# Patient Record
Sex: FEMALE | Race: WHITE | Employment: OTHER | URBAN - METROPOLITAN AREA
[De-identification: names, ages, dates, MRNs, and addresses within clinical notes are randomized per-mention and may not be internally consistent; named-entity substitution may affect disease eponyms.]

---

## 2021-09-26 ENCOUNTER — ANESTHESIA EVENT (OUTPATIENT)
Dept: SURGERY | Age: 81
DRG: 481 | End: 2021-09-26
Payer: MEDICARE

## 2021-09-26 ENCOUNTER — APPOINTMENT (OUTPATIENT)
Dept: CT IMAGING | Age: 81
DRG: 481 | End: 2021-09-26
Attending: EMERGENCY MEDICINE
Payer: MEDICARE

## 2021-09-26 ENCOUNTER — APPOINTMENT (OUTPATIENT)
Dept: GENERAL RADIOLOGY | Age: 81
DRG: 481 | End: 2021-09-26
Attending: EMERGENCY MEDICINE
Payer: MEDICARE

## 2021-09-26 ENCOUNTER — APPOINTMENT (OUTPATIENT)
Dept: GENERAL RADIOLOGY | Age: 81
DRG: 481 | End: 2021-09-26
Attending: ORTHOPAEDIC SURGERY
Payer: MEDICARE

## 2021-09-26 ENCOUNTER — HOSPITAL ENCOUNTER (INPATIENT)
Age: 81
LOS: 3 days | Discharge: NURSING, REHAB OR LONG-TERM CARE | DRG: 481 | End: 2021-09-29
Attending: EMERGENCY MEDICINE | Admitting: HOSPITALIST
Payer: MEDICARE

## 2021-09-26 ENCOUNTER — ANESTHESIA (OUTPATIENT)
Dept: SURGERY | Age: 81
DRG: 481 | End: 2021-09-26
Payer: MEDICARE

## 2021-09-26 DIAGNOSIS — S72.001A CLOSED FRACTURE OF RIGHT HIP, INITIAL ENCOUNTER (HCC): ICD-10-CM

## 2021-09-26 DIAGNOSIS — F03.90 DEMENTIA WITHOUT BEHAVIORAL DISTURBANCE, UNSPECIFIED DEMENTIA TYPE: Primary | ICD-10-CM

## 2021-09-26 DIAGNOSIS — I48.0 PAF (PAROXYSMAL ATRIAL FIBRILLATION) (HCC): ICD-10-CM

## 2021-09-26 PROBLEM — S72.009A HIP FRACTURE (HCC): Status: ACTIVE | Noted: 2021-09-26

## 2021-09-26 LAB
ALBUMIN SERPL-MCNC: 3.3 G/DL (ref 3.5–5)
ALBUMIN/GLOB SERPL: 1 {RATIO} (ref 1.1–2.2)
ALP SERPL-CCNC: 76 U/L (ref 45–117)
ALT SERPL-CCNC: 16 U/L (ref 12–78)
ANION GAP SERPL CALC-SCNC: 4 MMOL/L (ref 5–15)
APPEARANCE UR: CLEAR
APTT PPP: 33.3 SEC (ref 22.1–31)
AST SERPL-CCNC: 34 U/L (ref 15–37)
ATRIAL RATE: 68 BPM
BASOPHILS # BLD: 0 K/UL (ref 0–0.1)
BASOPHILS NFR BLD: 0 % (ref 0–1)
BILIRUB SERPL-MCNC: 0.5 MG/DL (ref 0.2–1)
BILIRUB UR QL: NEGATIVE
BUN SERPL-MCNC: 23 MG/DL (ref 6–20)
BUN/CREAT SERPL: 24 (ref 12–20)
CALCIUM SERPL-MCNC: 8.8 MG/DL (ref 8.5–10.1)
CALCULATED P AXIS, ECG09: 78 DEGREES
CALCULATED R AXIS, ECG10: 56 DEGREES
CALCULATED T AXIS, ECG11: 51 DEGREES
CHLORIDE SERPL-SCNC: 106 MMOL/L (ref 97–108)
CO2 SERPL-SCNC: 24 MMOL/L (ref 21–32)
COLOR UR: NORMAL
COMMENT, HOLDF: NORMAL
COMMENT, HOLDF: NORMAL
COVID-19 RAPID TEST, COVR: NOT DETECTED
CREAT SERPL-MCNC: 0.96 MG/DL (ref 0.55–1.02)
DIAGNOSIS, 93000: NORMAL
DIFFERENTIAL METHOD BLD: ABNORMAL
EOSINOPHIL # BLD: 0 K/UL (ref 0–0.4)
EOSINOPHIL NFR BLD: 0 % (ref 0–7)
ERYTHROCYTE [DISTWIDTH] IN BLOOD BY AUTOMATED COUNT: 15.6 % (ref 11.5–14.5)
GLOBULIN SER CALC-MCNC: 3.3 G/DL (ref 2–4)
GLUCOSE BLD STRIP.AUTO-MCNC: 177 MG/DL (ref 65–117)
GLUCOSE SERPL-MCNC: 150 MG/DL (ref 65–100)
GLUCOSE UR STRIP.AUTO-MCNC: NEGATIVE MG/DL
HCT VFR BLD AUTO: 34.1 % (ref 35–47)
HGB BLD-MCNC: 11.1 G/DL (ref 11.5–16)
HGB UR QL STRIP: NEGATIVE
IMM GRANULOCYTES # BLD AUTO: 0.2 K/UL (ref 0–0.04)
IMM GRANULOCYTES NFR BLD AUTO: 1 % (ref 0–0.5)
INR PPP: 1.4 (ref 0.9–1.1)
KETONES UR QL STRIP.AUTO: NEGATIVE MG/DL
LEUKOCYTE ESTERASE UR QL STRIP.AUTO: NEGATIVE
LYMPHOCYTES # BLD: 0.6 K/UL (ref 0.8–3.5)
LYMPHOCYTES NFR BLD: 3 % (ref 12–49)
MCH RBC QN AUTO: 29.1 PG (ref 26–34)
MCHC RBC AUTO-ENTMCNC: 32.6 G/DL (ref 30–36.5)
MCV RBC AUTO: 89.5 FL (ref 80–99)
MONOCYTES # BLD: 1.3 K/UL (ref 0–1)
MONOCYTES NFR BLD: 7 % (ref 5–13)
NEUTS SEG # BLD: 16.3 K/UL (ref 1.8–8)
NEUTS SEG NFR BLD: 89 % (ref 32–75)
NITRITE UR QL STRIP.AUTO: NEGATIVE
NRBC # BLD: 0 K/UL (ref 0–0.01)
NRBC BLD-RTO: 0 PER 100 WBC
P-R INTERVAL, ECG05: 234 MS
PH UR STRIP: 7 [PH] (ref 5–8)
PLATELET # BLD AUTO: 285 K/UL (ref 150–400)
PMV BLD AUTO: 12.2 FL (ref 8.9–12.9)
POTASSIUM SERPL-SCNC: 4.8 MMOL/L (ref 3.5–5.1)
PROT SERPL-MCNC: 6.6 G/DL (ref 6.4–8.2)
PROT UR STRIP-MCNC: NEGATIVE MG/DL
PROTHROMBIN TIME: 14.7 SEC (ref 9–11.1)
Q-T INTERVAL, ECG07: 440 MS
QRS DURATION, ECG06: 92 MS
QTC CALCULATION (BEZET), ECG08: 467 MS
RBC # BLD AUTO: 3.81 M/UL (ref 3.8–5.2)
RBC MORPH BLD: ABNORMAL
SAMPLES BEING HELD,HOLD: NORMAL
SAMPLES BEING HELD,HOLD: NORMAL
SERVICE CMNT-IMP: ABNORMAL
SODIUM SERPL-SCNC: 134 MMOL/L (ref 136–145)
SOURCE, COVRS: NORMAL
SP GR UR REFRACTOMETRY: 1.01 (ref 1–1.03)
THERAPEUTIC RANGE,PTTT: ABNORMAL SECS (ref 58–77)
UROBILINOGEN UR QL STRIP.AUTO: 0.2 EU/DL (ref 0.2–1)
VENTRICULAR RATE, ECG03: 68 BPM
WBC # BLD AUTO: 18.4 K/UL (ref 3.6–11)

## 2021-09-26 PROCEDURE — 87635 SARS-COV-2 COVID-19 AMP PRB: CPT

## 2021-09-26 PROCEDURE — C1713 ANCHOR/SCREW BN/BN,TIS/BN: HCPCS | Performed by: ORTHOPAEDIC SURGERY

## 2021-09-26 PROCEDURE — 0QS636Z REPOSITION RIGHT UPPER FEMUR WITH INTRAMEDULLARY INTERNAL FIXATION DEVICE, PERCUTANEOUS APPROACH: ICD-10-PCS | Performed by: ORTHOPAEDIC SURGERY

## 2021-09-26 PROCEDURE — 74011250637 HC RX REV CODE- 250/637: Performed by: HOSPITALIST

## 2021-09-26 PROCEDURE — 70450 CT HEAD/BRAIN W/O DYE: CPT

## 2021-09-26 PROCEDURE — 80053 COMPREHEN METABOLIC PANEL: CPT

## 2021-09-26 PROCEDURE — 85610 PROTHROMBIN TIME: CPT

## 2021-09-26 PROCEDURE — 77030008846 HC WRE K STRY -C: Performed by: ORTHOPAEDIC SURGERY

## 2021-09-26 PROCEDURE — 99285 EMERGENCY DEPT VISIT HI MDM: CPT

## 2021-09-26 PROCEDURE — 86901 BLOOD TYPING SEROLOGIC RH(D): CPT

## 2021-09-26 PROCEDURE — 74011250636 HC RX REV CODE- 250/636

## 2021-09-26 PROCEDURE — 73501 X-RAY EXAM HIP UNI 1 VIEW: CPT

## 2021-09-26 PROCEDURE — 71045 X-RAY EXAM CHEST 1 VIEW: CPT

## 2021-09-26 PROCEDURE — 74011250636 HC RX REV CODE- 250/636: Performed by: PHYSICIAN ASSISTANT

## 2021-09-26 PROCEDURE — 76210000016 HC OR PH I REC 1 TO 1.5 HR: Performed by: ORTHOPAEDIC SURGERY

## 2021-09-26 PROCEDURE — 93005 ELECTROCARDIOGRAM TRACING: CPT

## 2021-09-26 PROCEDURE — 73502 X-RAY EXAM HIP UNI 2-3 VIEWS: CPT

## 2021-09-26 PROCEDURE — 74011250636 HC RX REV CODE- 250/636: Performed by: NURSE ANESTHETIST, CERTIFIED REGISTERED

## 2021-09-26 PROCEDURE — P9045 ALBUMIN (HUMAN), 5%, 250 ML: HCPCS | Performed by: NURSE ANESTHETIST, CERTIFIED REGISTERED

## 2021-09-26 PROCEDURE — 77030008684 HC TU ET CUF COVD -B: Performed by: STUDENT IN AN ORGANIZED HEALTH CARE EDUCATION/TRAINING PROGRAM

## 2021-09-26 PROCEDURE — 77030020788: Performed by: ORTHOPAEDIC SURGERY

## 2021-09-26 PROCEDURE — 82962 GLUCOSE BLOOD TEST: CPT

## 2021-09-26 PROCEDURE — 74011250636 HC RX REV CODE- 250/636: Performed by: STUDENT IN AN ORGANIZED HEALTH CARE EDUCATION/TRAINING PROGRAM

## 2021-09-26 PROCEDURE — 81003 URINALYSIS AUTO W/O SCOPE: CPT

## 2021-09-26 PROCEDURE — 85025 COMPLETE CBC W/AUTO DIFF WBC: CPT

## 2021-09-26 PROCEDURE — 85730 THROMBOPLASTIN TIME PARTIAL: CPT

## 2021-09-26 PROCEDURE — 76010000149 HC OR TIME 1 TO 1.5 HR: Performed by: ORTHOPAEDIC SURGERY

## 2021-09-26 PROCEDURE — 99222 1ST HOSP IP/OBS MODERATE 55: CPT | Performed by: SPECIALIST

## 2021-09-26 PROCEDURE — C1769 GUIDE WIRE: HCPCS | Performed by: ORTHOPAEDIC SURGERY

## 2021-09-26 PROCEDURE — 86923 COMPATIBILITY TEST ELECTRIC: CPT

## 2021-09-26 PROCEDURE — 77030016451 HC BIT DRL AO3 STRY -C: Performed by: ORTHOPAEDIC SURGERY

## 2021-09-26 PROCEDURE — 65270000029 HC RM PRIVATE

## 2021-09-26 PROCEDURE — 77030031139 HC SUT VCRL2 J&J -A: Performed by: ORTHOPAEDIC SURGERY

## 2021-09-26 PROCEDURE — 36415 COLL VENOUS BLD VENIPUNCTURE: CPT

## 2021-09-26 PROCEDURE — 77030008468 HC STPLR SKN VISIS TELE -A: Performed by: ORTHOPAEDIC SURGERY

## 2021-09-26 PROCEDURE — 77030026438 HC STYL ET INTUB CARD -A: Performed by: STUDENT IN AN ORGANIZED HEALTH CARE EDUCATION/TRAINING PROGRAM

## 2021-09-26 PROCEDURE — 74011000250 HC RX REV CODE- 250: Performed by: PHYSICIAN ASSISTANT

## 2021-09-26 PROCEDURE — 76060000033 HC ANESTHESIA 1 TO 1.5 HR: Performed by: ORTHOPAEDIC SURGERY

## 2021-09-26 PROCEDURE — 74011000250 HC RX REV CODE- 250: Performed by: NURSE ANESTHETIST, CERTIFIED REGISTERED

## 2021-09-26 PROCEDURE — 77030013079 HC BLNKT BAIR HGGR 3M -A: Performed by: STUDENT IN AN ORGANIZED HEALTH CARE EDUCATION/TRAINING PROGRAM

## 2021-09-26 PROCEDURE — 2709999900 HC NON-CHARGEABLE SUPPLY: Performed by: ORTHOPAEDIC SURGERY

## 2021-09-26 DEVICE — LOCKING SCREW, FULLY THREADED: Type: IMPLANTABLE DEVICE | Site: HIP | Status: FUNCTIONAL

## 2021-09-26 DEVICE — LAG SCREW, TI
Type: IMPLANTABLE DEVICE | Site: HIP | Status: FUNCTIONAL
Brand: GAMMA

## 2021-09-26 DEVICE — TROCHANTERIC NAIL KIT, TI
Type: IMPLANTABLE DEVICE | Site: HIP | Status: FUNCTIONAL
Brand: GAMMA

## 2021-09-26 RX ORDER — OXYCODONE HYDROCHLORIDE 5 MG/1
5 TABLET ORAL
Status: DISCONTINUED | OUTPATIENT
Start: 2021-09-26 | End: 2021-09-29 | Stop reason: HOSPADM

## 2021-09-26 RX ORDER — SODIUM CHLORIDE 0.9 % (FLUSH) 0.9 %
5-40 SYRINGE (ML) INJECTION EVERY 8 HOURS
Status: DISCONTINUED | OUTPATIENT
Start: 2021-09-26 | End: 2021-09-29 | Stop reason: HOSPADM

## 2021-09-26 RX ORDER — SODIUM CHLORIDE 9 MG/ML
125 INJECTION, SOLUTION INTRAVENOUS CONTINUOUS
Status: CANCELLED | OUTPATIENT
Start: 2021-09-26 | End: 2021-09-27

## 2021-09-26 RX ORDER — SODIUM CHLORIDE 0.9 % (FLUSH) 0.9 %
5-40 SYRINGE (ML) INJECTION EVERY 8 HOURS
Status: DISCONTINUED | OUTPATIENT
Start: 2021-09-26 | End: 2021-09-26 | Stop reason: HOSPADM

## 2021-09-26 RX ORDER — OXYCODONE AND ACETAMINOPHEN 5; 325 MG/1; MG/1
1 TABLET ORAL
Status: DISCONTINUED | OUTPATIENT
Start: 2021-09-26 | End: 2021-09-27 | Stop reason: ALTCHOICE

## 2021-09-26 RX ORDER — ONDANSETRON 2 MG/ML
INJECTION INTRAMUSCULAR; INTRAVENOUS AS NEEDED
Status: DISCONTINUED | OUTPATIENT
Start: 2021-09-26 | End: 2021-09-26 | Stop reason: HOSPADM

## 2021-09-26 RX ORDER — SODIUM CHLORIDE 0.9 % (FLUSH) 0.9 %
5-40 SYRINGE (ML) INJECTION AS NEEDED
Status: DISCONTINUED | OUTPATIENT
Start: 2021-09-26 | End: 2021-09-26 | Stop reason: HOSPADM

## 2021-09-26 RX ORDER — LEVOTHYROXINE SODIUM 100 UG/1
100 TABLET ORAL
Status: DISCONTINUED | OUTPATIENT
Start: 2021-09-27 | End: 2021-09-29 | Stop reason: HOSPADM

## 2021-09-26 RX ORDER — MORPHINE SULFATE 10 MG/ML
INJECTION, SOLUTION INTRAMUSCULAR; INTRAVENOUS
Status: COMPLETED
Start: 2021-09-26 | End: 2021-09-26

## 2021-09-26 RX ORDER — OXYCODONE AND ACETAMINOPHEN 5; 325 MG/1; MG/1
1 TABLET ORAL AS NEEDED
Status: DISCONTINUED | OUTPATIENT
Start: 2021-09-26 | End: 2021-09-26 | Stop reason: HOSPADM

## 2021-09-26 RX ORDER — FENTANYL CITRATE 50 UG/ML
50 INJECTION, SOLUTION INTRAMUSCULAR; INTRAVENOUS AS NEEDED
Status: CANCELLED | OUTPATIENT
Start: 2021-09-26

## 2021-09-26 RX ORDER — MIDAZOLAM HYDROCHLORIDE 1 MG/ML
1 INJECTION, SOLUTION INTRAMUSCULAR; INTRAVENOUS AS NEEDED
Status: CANCELLED | OUTPATIENT
Start: 2021-09-26

## 2021-09-26 RX ORDER — FAMOTIDINE 20 MG/1
20 TABLET, FILM COATED ORAL EVERY 24 HOURS
Status: DISCONTINUED | OUTPATIENT
Start: 2021-09-26 | End: 2021-09-29 | Stop reason: HOSPADM

## 2021-09-26 RX ORDER — DIPHENHYDRAMINE HYDROCHLORIDE 50 MG/ML
12.5 INJECTION, SOLUTION INTRAMUSCULAR; INTRAVENOUS AS NEEDED
Status: DISCONTINUED | OUTPATIENT
Start: 2021-09-26 | End: 2021-09-26 | Stop reason: HOSPADM

## 2021-09-26 RX ORDER — SODIUM CHLORIDE 0.9 % (FLUSH) 0.9 %
5-40 SYRINGE (ML) INJECTION AS NEEDED
Status: DISCONTINUED | OUTPATIENT
Start: 2021-09-26 | End: 2021-09-29 | Stop reason: HOSPADM

## 2021-09-26 RX ORDER — SODIUM CHLORIDE, SODIUM LACTATE, POTASSIUM CHLORIDE, CALCIUM CHLORIDE 600; 310; 30; 20 MG/100ML; MG/100ML; MG/100ML; MG/100ML
1000 INJECTION, SOLUTION INTRAVENOUS CONTINUOUS
Status: DISCONTINUED | OUTPATIENT
Start: 2021-09-26 | End: 2021-09-26 | Stop reason: HOSPADM

## 2021-09-26 RX ORDER — SUCCINYLCHOLINE CHLORIDE 20 MG/ML
INJECTION INTRAMUSCULAR; INTRAVENOUS AS NEEDED
Status: DISCONTINUED | OUTPATIENT
Start: 2021-09-26 | End: 2021-09-26 | Stop reason: HOSPADM

## 2021-09-26 RX ORDER — FACIAL-BODY WIPES
10 EACH TOPICAL DAILY PRN
Status: DISCONTINUED | OUTPATIENT
Start: 2021-09-28 | End: 2021-09-29 | Stop reason: HOSPADM

## 2021-09-26 RX ORDER — MIRTAZAPINE 15 MG/1
15 TABLET, FILM COATED ORAL
Status: DISCONTINUED | OUTPATIENT
Start: 2021-09-26 | End: 2021-09-29 | Stop reason: HOSPADM

## 2021-09-26 RX ORDER — OXYCODONE HYDROCHLORIDE 5 MG/1
2.5 TABLET ORAL
Status: DISCONTINUED | OUTPATIENT
Start: 2021-09-26 | End: 2021-09-29 | Stop reason: HOSPADM

## 2021-09-26 RX ORDER — SODIUM CHLORIDE 9 MG/ML
1000 INJECTION, SOLUTION INTRAVENOUS CONTINUOUS
Status: DISCONTINUED | OUTPATIENT
Start: 2021-09-26 | End: 2021-09-26 | Stop reason: HOSPADM

## 2021-09-26 RX ORDER — ONDANSETRON 2 MG/ML
4 INJECTION INTRAMUSCULAR; INTRAVENOUS AS NEEDED
Status: DISCONTINUED | OUTPATIENT
Start: 2021-09-26 | End: 2021-09-26 | Stop reason: HOSPADM

## 2021-09-26 RX ORDER — FENTANYL CITRATE 50 UG/ML
25 INJECTION, SOLUTION INTRAMUSCULAR; INTRAVENOUS
Status: DISCONTINUED | OUTPATIENT
Start: 2021-09-26 | End: 2021-09-26 | Stop reason: HOSPADM

## 2021-09-26 RX ORDER — SODIUM CHLORIDE 0.9 % (FLUSH) 0.9 %
5-40 SYRINGE (ML) INJECTION AS NEEDED
Status: CANCELLED | OUTPATIENT
Start: 2021-09-26

## 2021-09-26 RX ORDER — SODIUM CHLORIDE 0.9 % (FLUSH) 0.9 %
5-40 SYRINGE (ML) INJECTION EVERY 8 HOURS
Status: CANCELLED | OUTPATIENT
Start: 2021-09-26

## 2021-09-26 RX ORDER — SODIUM CHLORIDE, SODIUM LACTATE, POTASSIUM CHLORIDE, CALCIUM CHLORIDE 600; 310; 30; 20 MG/100ML; MG/100ML; MG/100ML; MG/100ML
125 INJECTION, SOLUTION INTRAVENOUS CONTINUOUS
Status: CANCELLED | OUTPATIENT
Start: 2021-09-26 | End: 2021-09-27

## 2021-09-26 RX ORDER — EPHEDRINE SULFATE/0.9% NACL/PF 50 MG/5 ML
5 SYRINGE (ML) INTRAVENOUS AS NEEDED
Status: DISCONTINUED | OUTPATIENT
Start: 2021-09-26 | End: 2021-09-26 | Stop reason: HOSPADM

## 2021-09-26 RX ORDER — NALOXONE HYDROCHLORIDE 0.4 MG/ML
0.4 INJECTION, SOLUTION INTRAMUSCULAR; INTRAVENOUS; SUBCUTANEOUS AS NEEDED
Status: DISCONTINUED | OUTPATIENT
Start: 2021-09-26 | End: 2021-09-29 | Stop reason: HOSPADM

## 2021-09-26 RX ORDER — DEXAMETHASONE SODIUM PHOSPHATE 4 MG/ML
INJECTION, SOLUTION INTRA-ARTICULAR; INTRALESIONAL; INTRAMUSCULAR; INTRAVENOUS; SOFT TISSUE AS NEEDED
Status: DISCONTINUED | OUTPATIENT
Start: 2021-09-26 | End: 2021-09-26 | Stop reason: HOSPADM

## 2021-09-26 RX ORDER — MORPHINE SULFATE 2 MG/ML
2 INJECTION, SOLUTION INTRAMUSCULAR; INTRAVENOUS
Status: DISCONTINUED | OUTPATIENT
Start: 2021-09-26 | End: 2021-09-26 | Stop reason: HOSPADM

## 2021-09-26 RX ORDER — POLYETHYLENE GLYCOL 3350 17 G/17G
17 POWDER, FOR SOLUTION ORAL DAILY
Status: DISCONTINUED | OUTPATIENT
Start: 2021-09-27 | End: 2021-09-29 | Stop reason: HOSPADM

## 2021-09-26 RX ORDER — SODIUM CHLORIDE, SODIUM LACTATE, POTASSIUM CHLORIDE, CALCIUM CHLORIDE 600; 310; 30; 20 MG/100ML; MG/100ML; MG/100ML; MG/100ML
INJECTION, SOLUTION INTRAVENOUS
Status: DISCONTINUED | OUTPATIENT
Start: 2021-09-26 | End: 2021-09-26 | Stop reason: HOSPADM

## 2021-09-26 RX ORDER — ALBUMIN HUMAN 50 G/1000ML
SOLUTION INTRAVENOUS AS NEEDED
Status: DISCONTINUED | OUTPATIENT
Start: 2021-09-26 | End: 2021-09-26 | Stop reason: HOSPADM

## 2021-09-26 RX ORDER — MIDAZOLAM HYDROCHLORIDE 1 MG/ML
0.5 INJECTION, SOLUTION INTRAMUSCULAR; INTRAVENOUS
Status: DISCONTINUED | OUTPATIENT
Start: 2021-09-26 | End: 2021-09-26 | Stop reason: HOSPADM

## 2021-09-26 RX ORDER — HYDROMORPHONE HYDROCHLORIDE 1 MG/ML
0.2 INJECTION, SOLUTION INTRAMUSCULAR; INTRAVENOUS; SUBCUTANEOUS
Status: DISCONTINUED | OUTPATIENT
Start: 2021-09-26 | End: 2021-09-26 | Stop reason: HOSPADM

## 2021-09-26 RX ORDER — LIDOCAINE HYDROCHLORIDE 10 MG/ML
0.1 INJECTION, SOLUTION EPIDURAL; INFILTRATION; INTRACAUDAL; PERINEURAL AS NEEDED
Status: CANCELLED | OUTPATIENT
Start: 2021-09-26

## 2021-09-26 RX ORDER — FERROUS SULFATE, DRIED 160(50) MG
1 TABLET, EXTENDED RELEASE ORAL
Status: DISCONTINUED | OUTPATIENT
Start: 2021-09-27 | End: 2021-09-29 | Stop reason: HOSPADM

## 2021-09-26 RX ORDER — LIDOCAINE HYDROCHLORIDE 20 MG/ML
INJECTION, SOLUTION EPIDURAL; INFILTRATION; INTRACAUDAL; PERINEURAL AS NEEDED
Status: DISCONTINUED | OUTPATIENT
Start: 2021-09-26 | End: 2021-09-26 | Stop reason: HOSPADM

## 2021-09-26 RX ORDER — FENTANYL CITRATE 50 UG/ML
INJECTION, SOLUTION INTRAMUSCULAR; INTRAVENOUS AS NEEDED
Status: DISCONTINUED | OUTPATIENT
Start: 2021-09-26 | End: 2021-09-26 | Stop reason: HOSPADM

## 2021-09-26 RX ORDER — QUETIAPINE FUMARATE 25 MG/1
50 TABLET, FILM COATED ORAL 2 TIMES DAILY
Status: DISCONTINUED | OUTPATIENT
Start: 2021-09-26 | End: 2021-09-29 | Stop reason: HOSPADM

## 2021-09-26 RX ORDER — AMOXICILLIN 250 MG
1 CAPSULE ORAL 2 TIMES DAILY
Status: DISCONTINUED | OUTPATIENT
Start: 2021-09-26 | End: 2021-09-29 | Stop reason: HOSPADM

## 2021-09-26 RX ORDER — PROPOFOL 10 MG/ML
INJECTION, EMULSION INTRAVENOUS AS NEEDED
Status: DISCONTINUED | OUTPATIENT
Start: 2021-09-26 | End: 2021-09-26 | Stop reason: HOSPADM

## 2021-09-26 RX ORDER — SODIUM CHLORIDE 9 MG/ML
125 INJECTION, SOLUTION INTRAVENOUS CONTINUOUS
Status: DISPENSED | OUTPATIENT
Start: 2021-09-26 | End: 2021-09-27

## 2021-09-26 RX ORDER — POTASSIUM CHLORIDE 750 MG/1
20 TABLET, FILM COATED, EXTENDED RELEASE ORAL DAILY
Status: DISCONTINUED | OUTPATIENT
Start: 2021-09-27 | End: 2021-09-29 | Stop reason: HOSPADM

## 2021-09-26 RX ORDER — ROCURONIUM BROMIDE 10 MG/ML
INJECTION, SOLUTION INTRAVENOUS AS NEEDED
Status: DISCONTINUED | OUTPATIENT
Start: 2021-09-26 | End: 2021-09-26 | Stop reason: HOSPADM

## 2021-09-26 RX ORDER — KETAMINE HYDROCHLORIDE 10 MG/ML
INJECTION, SOLUTION INTRAMUSCULAR; INTRAVENOUS AS NEEDED
Status: DISCONTINUED | OUTPATIENT
Start: 2021-09-26 | End: 2021-09-26 | Stop reason: HOSPADM

## 2021-09-26 RX ORDER — SERTRALINE HYDROCHLORIDE 50 MG/1
150 TABLET, FILM COATED ORAL DAILY
Status: DISCONTINUED | OUTPATIENT
Start: 2021-09-27 | End: 2021-09-29 | Stop reason: HOSPADM

## 2021-09-26 RX ORDER — ACETAMINOPHEN 500 MG
1000 TABLET ORAL ONCE
Status: CANCELLED | OUTPATIENT
Start: 2021-09-26 | End: 2021-09-26

## 2021-09-26 RX ADMIN — WATER 2 G: 1 INJECTION INTRAMUSCULAR; INTRAVENOUS; SUBCUTANEOUS at 17:49

## 2021-09-26 RX ADMIN — ROCURONIUM BROMIDE 5 MG: 10 SOLUTION INTRAVENOUS at 17:32

## 2021-09-26 RX ADMIN — SODIUM CHLORIDE, POTASSIUM CHLORIDE, SODIUM LACTATE AND CALCIUM CHLORIDE: 600; 310; 30; 20 INJECTION, SOLUTION INTRAVENOUS at 17:25

## 2021-09-26 RX ADMIN — SODIUM CHLORIDE 125 ML/HR: 9 INJECTION, SOLUTION INTRAVENOUS at 18:40

## 2021-09-26 RX ADMIN — ONDANSETRON HYDROCHLORIDE 4 MG: 2 INJECTION, SOLUTION INTRAMUSCULAR; INTRAVENOUS at 18:10

## 2021-09-26 RX ADMIN — LIDOCAINE HYDROCHLORIDE 80 MG: 20 INJECTION, SOLUTION EPIDURAL; INFILTRATION; INTRACAUDAL; PERINEURAL at 17:32

## 2021-09-26 RX ADMIN — KETAMINE HYDROCHLORIDE 20 MG: 10 INJECTION, SOLUTION INTRAMUSCULAR; INTRAVENOUS at 17:32

## 2021-09-26 RX ADMIN — MORPHINE SULFATE 2 MG: 10 INJECTION INTRAVENOUS at 19:00

## 2021-09-26 RX ADMIN — ROCURONIUM BROMIDE 35 MG: 10 SOLUTION INTRAVENOUS at 17:39

## 2021-09-26 RX ADMIN — Medication 10 ML: at 22:00

## 2021-09-26 RX ADMIN — PROPOFOL 100 MG: 10 INJECTION, EMULSION INTRAVENOUS at 17:32

## 2021-09-26 RX ADMIN — MIRTAZAPINE 15 MG: 15 TABLET, FILM COATED ORAL at 23:59

## 2021-09-26 RX ADMIN — WATER 2 G: 1 INJECTION INTRAMUSCULAR; INTRAVENOUS; SUBCUTANEOUS at 23:16

## 2021-09-26 RX ADMIN — SUCCINYLCHOLINE CHLORIDE 100 MG: 20 INJECTION, SOLUTION INTRAMUSCULAR; INTRAVENOUS at 17:32

## 2021-09-26 RX ADMIN — FENTANYL CITRATE 25 MCG: 50 INJECTION, SOLUTION INTRAMUSCULAR; INTRAVENOUS at 17:32

## 2021-09-26 RX ADMIN — FENTANYL CITRATE 25 MCG: 50 INJECTION, SOLUTION INTRAMUSCULAR; INTRAVENOUS at 17:59

## 2021-09-26 RX ADMIN — ALBUMIN (HUMAN) 250 ML: 12.5 INJECTION, SOLUTION INTRAVENOUS at 17:44

## 2021-09-26 RX ADMIN — DEXAMETHASONE SODIUM PHOSPHATE 4 MG: 4 INJECTION, SOLUTION INTRAMUSCULAR; INTRAVENOUS at 17:46

## 2021-09-26 NOTE — PROGRESS NOTES
Bedside and Verbal shift change report given to Rianna Babin RN (oncoming nurse) by Dorina Cheatham RN (offgoing nurse). Report included the following information SBAR, ED Summary, OR Summary, Procedure Summary, MAR and Recent Results.

## 2021-09-26 NOTE — PROGRESS NOTES
Renal Dosing/Monitoring  Medication: Famotidine   Current regimen:  20mg PO every 12 hr  Recent Labs     09/26/21  1115   CREA 0.96   BUN 23*     Estimated CrCl:  39.7 ml/min  Plan: Change to Famotidine 20mg PO q24h  with respect to indication and renal status (CrCl <50 mL/min) per [facility]/[employer]-approved Renal Dosing Adjustment protocol. Pharmacy will continue to monitor this patient daily for changes in clinical status and renal function.

## 2021-09-26 NOTE — ANESTHESIA POSTPROCEDURE EVALUATION
Procedure(s):  Alli gamma nail. Soledad tableC arm.    general    Anesthesia Post Evaluation      Multimodal analgesia: multimodal analgesia used between 6 hours prior to anesthesia start to PACU discharge  Patient location during evaluation: bedside  Patient participation: complete - patient participated  Level of consciousness: awake  Pain score: 0  Pain management: satisfactory to patient  Airway patency: patent  Anesthetic complications: no  Cardiovascular status: acceptable and blood pressure returned to baseline  Respiratory status: acceptable  Hydration status: acceptable  Comments: I have evaluated the patient and meets criteria for discharge from PACU. Danni Jessica DO. Post anesthesia nausea and vomiting:  none  Final Post Anesthesia Temperature Assessment:  Normothermia (36.0-37.5 degrees C)      INITIAL Post-op Vital signs:   Vitals Value Taken Time   /83 09/26/21 1835   Temp 36.8 °C (98.3 °F) 09/26/21 1834   Pulse 78 09/26/21 1837   Resp 21 09/26/21 1837   SpO2 98 % 09/26/21 1837   Vitals shown include unvalidated device data.

## 2021-09-26 NOTE — ANESTHESIA PREPROCEDURE EVALUATION
Relevant Problems   No relevant active problems       Anesthetic History     Increased risk of difficult airway     Comments: H/o of tracheostomy and difficult intubation due to narrowed glottis     Review of Systems / Medical History  Patient summary reviewed, nursing notes reviewed and pertinent labs reviewed    Pulmonary                   Neuro/Psych         Psychiatric history     Cardiovascular    Hypertension        Dysrhythmias : atrial fibrillation  CAD      Comments: EKG 9/2021 shows SR with 1degree AVB    Paroxysmal atrial fibrillation, last took xarelto 9/25/2021   GI/Hepatic/Renal         Renal disease: CRI       Endo/Other        Anemia     Other Findings            Physical Exam    Airway  Mallampati: II  TM Distance: > 6 cm  Neck ROM: normal range of motion   Mouth opening: Normal     Cardiovascular    Rhythm: regular  Rate: normal         Dental  No notable dental hx       Pulmonary  Breath sounds clear to auscultation               Abdominal  GI exam deferred       Other Findings            Anesthetic Plan    ASA: 2, emergent  Anesthesia type: general          Induction: Intravenous  Anesthetic plan and risks discussed with: Patient and Son / Daughter      Patient had ability but questionable capacity due to confusion. Daughter was at bedside and the [employer], and we discussed the risks/benefits of anesthesia and agreed to proceed. The patient had a DNR and the daughter agreed to suspend the DNR for the perioperative period. Daughter mentioned the patient previously had a tracheostomy due to complicated pneumonia and as a result had a narrowed trachea/glottis and had previous difficulty with intubation.

## 2021-09-26 NOTE — H&P
[address] [facility]  HISTORY AND PHYSICAL    Name:  Hugo Martinez  MR#:  [id number]  :  1940  ACCOUNT #:  [de-identified]  ADMIT DATE:  2021      PRIMARY CARE PHYSICIAN:  Unknown. PRESENTING COMPLAINT:  Fall. HISTORY OF PRESENT ILLNESS:      The patient is a very pleasant 70-year-old  female who has previous history significant for chronic atrial fibrillation along with dementia and hypothyroidism. She currently resides at [facility]. The patient was sent to the emergency room as she had a fall early this morning. The patient lives with her sister in a single room in [facility]. Her sister fell down this morning. The patient  decided to help her, but  she also fell down. In the emergency room, the patient's imaging was suggestive of a fracture of the right femoral intertrochanteric area. We are asked to admit the patient. The patient's daughter is here who tells me that she had a right hip fracture last year. At that time, she was treated at [facility] as she has a narrow trachea and anesthesiologist told them to go to [facility]. At this time, the patient has no other complaints. PAST MEDICAL HISTORY:      1. Chronic atrial fibrillation. 2.  History of remote MI. The patient's daughter is not sure if she had a stent  3. Hypothyroidism. 4.  Dementia. 5.  Falls. 6.  Previous history of hip surgery. SOCIOECONOMIC HISTORY:      The patient does not smoke or drink. She lives at [facility]. Code status is do not resuscitate. FAMILY HISTORY:  Unknown. CURRENT MEDICATIONS PRIOR TO ADMISSION:      1. Levothyroxine 100 mcg daily. 2.  Potassium 20 mEq daily. 3.  Sertraline (Zoloft) 150 mg daily. 4.  Vitamin B12 every day sublingually. 5.  Xarelto 15 mg daily for AFib. 6.  Mirtazapine 15 mg daily for insomnia. 7.  BuSpar 15 mg twice daily for anxiety. 8.  P.r.n. Zofran.   9.  Metoprolol succinate 50 mg every 24 hours. 10.  Diclofenac gel topical application. 11.  Calcium. 12.  Quetiapine 50 mg 1 tablet p.o. b.i.d. REVIEW OF SYSTEMS:  Cannot be done as the patient has dementia. PHYSICAL EXAMINATION:    GENERAL:  The patient is an 40-year-old female who is not in any acute distress, resting comfortably. VITAL SIGNS:  Reveal temperature of 98.9, blood pressure 134/78, pulse 71, respiratory rate 19, saturation is 94% on room air. HEENT:  Examination reveals pupils equally reacting to light and accommodation. NECK:  Supple. There is no lymphadenopathy or JVD. CHEST:  Clear. No significant wheezing or crackles. CARDIOVASCULAR SYSTEM:  S1 and S2 regular. No murmur. No S3.  ABDOMEN:  Examination reveals no tenderness, no guarding or rigidity. Bowel sounds are active. EXTREMITIES:  No pedal edema. Hip examination of the right side shows decrease in motion with tenderness. CNS:  Examination reveals the patient is pleasantly confused. Otherwise nonfocal exam.    LABORATORY DATA:  Labs revealed white count is 18.4, hemoglobin is 11.1, hematocrit 34.1, MCV is 89.5, platelet count is 172,475. Sodium 134, potassium 4.8, chloride is 106, bicarbonate is 24, glucose 150, BUN is 33, creatinine 0.96, bilirubin 0.5, protein 6.6, albumin is 3.3. AST and ALT are normal.  Alkaline phosphatase is 76. INR is 1.4. EKG shows normal sinus rhythm with first-degree block, low voltage, no acute ischemic changes. CT scan of the head shows no acute intracranial process. It does show mild chronic microvascular ischemic changes with moderate to severe degree of cerebral atrophy. Chest x-ray shows no acute finding. X-ray of the hip shows comminuted right femoral intertrochanteric fracture. ASSESSMENT AND PLAN:      The patient is an 40-year-old female with previous history significant for chronic atrial fibrillation, remote MI ? Stents which was diagnosed last year, history of left hip fracture in 2020.   She is COVID vaccinated, is being admitted with:    1. Fall with right comminuted femoral intertrochanteric fracture. Orthopedics will be consulted. The patient is on Xarelto, not clear if she took it today. 2.  History of remote MI and chronic atrial fibrillation, currently in sinus rhythm. Cardiology clearence for surgery. No history of recent chest pain. Restart blood thinners after surgery per Ortho recommendation. 3.  History of hypothyroidism. Continue on levothyroxine. 4.  History of dementia with behavioral issues. We will continue current medications. 5.  History of atrial fibrillation, rate controlled, on metoprolol succinate with previous history of coronary artery disease. We will continue metoprolol. Consider adding statin. 6.  Deep venous thrombosis prophylaxis.       Kirsten Yates MD      [id number]  D:  09/26/2021 13:07  T:  09/26/2021 14:19  JOB #:  [id number]

## 2021-09-26 NOTE — PERIOP NOTES
TRANSFER - IN REPORT:    Verbal report received from CHIDI Del CastilloRN(name) on Cathryn Garza  being received from ED(unit) for ordered procedure      Report consisted of patients Situation, Background, Assessment and   Recommendations(SBAR). Information from the following report(s) ED Summary, Recent Results and Procedure Verification was reviewed with the receiving nurse. Opportunity for questions and clarification was provided. Assessment completed upon patients arrival to unit and care assumed.

## 2021-09-26 NOTE — ED PROVIDER NOTES
This is an 72-year-old female with dementia who apparently fell last night and since then has complained of severe pain in her right hip. She has the dementia but seems to think she may have hit her head. She denies any neck pain or pain elsewhere. There was no known loss of consciousness. She has not been able to get up and ambulate since the fall with the degree of pain in her hip. She denies any other acute symptomatology presently although she does have dementia. No past medical history on file. No past surgical history on file. No family history on file. Social History     Socioeconomic History    Marital status: Not on file     Spouse name: Not on file    Number of children: Not on file    Years of education: Not on file    Highest education level: Not on file   Occupational History    Not on file   Tobacco Use    Smoking status: Not on file   Substance and Sexual Activity    Alcohol use: Not on file    Drug use: Not on file    Sexual activity: Not on file   Other Topics Concern    Not on file   Social History Narrative    Not on file     Social Determinants of Health     Financial Resource Strain:     Difficulty of Paying Living Expenses:    Food Insecurity:     Worried About Running Out of Food in the Last Year:     [address] in the Last Year:    Transportation Needs:     Lack of Transportation (Medical):      Lack of Transportation (Non-Medical):    Physical Activity:     Days of Exercise per Week:     Minutes of Exercise per Session:    Stress:     Feeling of Stress :    Social Connections:     Frequency of Communication with Friends and Family:     Frequency of Social Gatherings with Friends and Family:     Attends [employer] Services:     Active Member of Clubs or Organizations:     Attends Club or Organization Meetings:     Marital Status:    Intimate Partner Violence:     Fear of Current or Ex-Partner:     Emotionally Abused:     Physically Abused:     Sexually Abused: ALLERGIES: Patient has no known allergies. Review of Systems   Unable to perform ROS: Dementia   Musculoskeletal:        Severe right hip pain. Inability to move the leg without pain. Vitals:    09/26/21 1056   BP: (!) 142/79   Pulse: 72   Resp: 22   Temp: 98.9 °F (37.2 °C)   SpO2: 94%   Weight: 57.5 kg (126 lb 12.2 oz)   Height: 5' 4\" (1.626 m)            Physical Exam  Vitals and nursing note reviewed. Constitutional:       General: She is in acute distress ( With pain in the right hip). Appearance: She is well-developed. She is not ill-appearing or diaphoretic. Comments: This is an 80-year-old female with dementia and some confusion. Vital signs are as noted. HENT:      Head: Normocephalic and atraumatic. Comments: There is no apparent scalp hematoma or evidence of trauma noted. Nose: Nose normal.      Mouth/Throat:      Mouth: Mucous membranes are moist.   Eyes:      General: No scleral icterus. Conjunctiva/sclera: Conjunctivae normal.      Pupils: Pupils are equal, round, and reactive to light. Neck:      Thyroid: No thyromegaly. Vascular: No JVD. Trachea: No tracheal deviation. Comments: No carotid bruits noted. Cardiovascular:      Rate and Rhythm: Normal rate and regular rhythm. Heart sounds: Normal heart sounds. No murmur heard. No friction rub. No gallop. Pulmonary:      Effort: Pulmonary effort is normal. No respiratory distress. Breath sounds: Normal breath sounds. No wheezing or rales. Chest:      Chest wall: No tenderness. Abdominal:      General: Bowel sounds are normal. There is no distension. Palpations: Abdomen is soft. There is no mass. Tenderness: There is no abdominal tenderness. There is no guarding or rebound.    Musculoskeletal:         General: Tenderness ( There is pain to palpation of the right hip with apparent foreshortening and external rotation noted in the same leg.) present. No deformity or signs of injury. Normal range of motion. Cervical back: Normal range of motion and neck supple. Comments: There is foreshortening and external rotation of the right lower extremity. There is pain in the right hip with movement of the same. Other extremities without apparent injury   Lymphadenopathy:      Cervical: No cervical adenopathy. Skin:     General: Skin is warm and dry. Capillary Refill: Capillary refill takes 2 to 3 seconds. Findings: No erythema or rash. Neurological:      General: No focal deficit present. Mental Status: She is alert. Mental status is at baseline. Cranial Nerves: No cranial nerve deficit. Sensory: No sensory deficit. Coordination: Coordination normal.      Deep Tendon Reflexes: Reflexes are normal and symmetric. Psychiatric:      Comments: Unable to determine with dementia          MDM  Number of Diagnoses or Management Options  Closed fracture of right hip, initial encounter [facility]): new and requires workup  Dementia without behavioral disturbance, unspecified dementia type ([facility]): established and worsening     Amount and/or Complexity of Data Reviewed  Clinical lab tests: reviewed and ordered  Tests in the radiology section of CPT®: ordered and reviewed  Decide to obtain previous medical records or to obtain history from someone other than the patient: yes  Review and summarize past medical records: yes  Discuss the patient with other providers: yes  Independent visualization of images, tracings, or specimens: yes    Risk of Complications, Morbidity, and/or Mortality  Presenting problems: high  Diagnostic procedures: high  Management options: high    Patient Progress  Patient progress: stable         Procedures    X-rays of the right hip and CT of the head are ordered as well as admission lab.   Patient likely has a fracture of the right hip.    11:44 AM   The patient's hip x-ray demonstrates a comminuted intertrochanteric fracture of the right hip. We will consult the family for orthopedic preference. Patient will require admission for further evaluation and treatment. Patient's prior hip replacement was done at [facility]. Daughter would rather the surgery be done here. Will consult orthopedics and medicine. Perfect Serve Consult for Admission  11:51 AM    ED Room Number: UQ96/16  Patient Name and age:  Valdo Kolb 80 y.o.  female  Working Diagnosis:   1. Dementia without behavioral disturbance, unspecified dementia type ([facility]    2. Closed fracture of right hip, initial encounter ([facility]        COVID-19 Suspicion:  no  Sepsis present:  no  Reassessment needed: no  Code Status:  Full Code  Readmission: no  Isolation Requirements:  no  Recommended Level of Care:  med/surg  Department:[facility] Adult ED - 21   Other:  Ortho consulted    ED MD EKG interpretation: There is a normal sinus rhythm with first-degree AV block. Axis is normal, intervals are normal.  Poor R wave progression is noted. No acute ischemic changes noted    12:04 PM I have spoken with Ortho and they will see the patient shortly in the ED.   She is being kept n.p.o.

## 2021-09-26 NOTE — CONSULTS
HISTORY OF PRESENT ILLNESS:       The patient is a very pleasant 80-year-old  female who has previous history significant for chronic atrial fibrillation along with dementia and hypothyroidism. She currently resides at [facility]. The patient was sent to the emergency room as she had a fall early this morning. The patient lives with her sister in a single room in [facility]. Her sister fell down this morning. The patient  decided to help her, but  she also fell down. In the emergency room, the patient's imaging was suggestive of a fracture of the right femoral intertrochanteric area. We are asked to admit the patient. The patient's daughter is here who tells me that she had a right hip fracture last year. At that time, she was treated at [facility] as she has a narrow trachea and anesthesiologist told them to go to [facility]. At this time, the patient has no other complaints. X Rays performed in the ER reveal right intertrochanteric hip fracture. Orthopedic surgery consulted to further evaluate and treat the above fracture. PAST MEDICAL HISTORY:       1. Chronic atrial fibrillation. 2.  History of remote MI. The patient's daughter is not sure if she had a stent  3. Hypothyroidism. 4.  Dementia. 5.  Falls. 6.  Previous history of hip surgery.     SOCIOECONOMIC HISTORY:      The patient does not smoke or drink. She lives at [facility]. Code status is do not resuscitate. CURRENT MEDICATIONS PRIOR TO ADMISSION:       1.  Levothyroxine 100 mcg daily. 2.  Potassium 20 mEq daily. 3.  Sertraline (Zoloft) 150 mg daily. 4.  Vitamin B12 every day sublingually. 5.  Xarelto 15 mg daily for AFib. 6.  Mirtazapine 15 mg daily for insomnia. 7.  BuSpar 15 mg twice daily for anxiety. 8.  P.r.n. Zofran. 9.  Metoprolol succinate 50 mg every 24 hours. 10.  Diclofenac gel topical application. 11.  Calcium.   12.  Quetiapine 50 mg 1 tablet p.o. b.i.d.     REVIEW OF SYSTEMS:  Cannot be done as the patient has dementia. Physical Exam:  GENERAL:  The patient is an 27-year-old female who is not in any acute distress, resting comfortably. VITAL SIGNS:  Reveal temperature of 98.9, blood pressure 134/78, pulse 71, respiratory rate 19, saturation is 94% on room air. HEENT:  Examination reveals pupils equally reacting to light and accommodation. NECK:  Supple. There is no lymphadenopathy or JVD. CHEST:  Clear. No significant wheezing or crackles. CARDIOVASCULAR SYSTEM:  S1 and S2 regular. No murmur. No S3.  ABDOMEN:  Examination reveals no tenderness, no guarding or rigidity. Bowel sounds are active. EXTREMITIES:  No pedal edema. Hip examination of the right side shows decrease in motion with tenderness. CNS:  Examination reveals the patient is pleasantly confused. Otherwise nonfocal exam.      Assessment     Right intertrochanteric femur fracture     Plan    -Obtain medical clearance for IM nailing of right femur fracture  -Make patient NPO  -Hold Xarelto    Plan for IM nailing of right intertrochanteric femur fracture. Patient educated on risks, benefits,and post operative recovery process and elects to proceed.

## 2021-09-26 NOTE — PROGRESS NOTES
TRANSFER - IN REPORT:    Verbal report received from [facility] (name) on Cheryle Dykes  being received from ED (unit) for routine progression of care      Report consisted of patients Situation, Background, Assessment and   Recommendations(SBAR). Information from the following report(s) SBAR, ED Summary, [facility] and Recent Results was reviewed with the receiving nurse. Opportunity for questions and clarification was provided. Assessment completed upon patients arrival to unit and care assumed.

## 2021-09-26 NOTE — CONSULTS
Consults by NATALIE Powers at 09/26/21 1637    Author: NATALIE oPwers Service: Physician Assistant Author Type: Physician Assistant   Filed: 09/26/21 1648 Date of Service: 09/26/21 1637 Status: Cosign Needed   : NATALIE Powers (Physician Assistant) Cosign Required: Yes        []Hide copied text    []Hover for details  HISTORY OF PRESENT ILLNESS:       The patient is a very pleasant 28-year-old  female who has previous history significant for chronic atrial fibrillation along with dementia and hypothyroidism.  She currently resides at [address] [place] [address] patient was sent to the emergency room as she had a fall early this morning.  The patient lives with her sister in a single room in [facility].  Her sister fell down this morning.  The patient  decided to help her, but Lesa Carr also fell down.  In the emergency room, the patient's imaging was suggestive of a fracture of the right femoral intertrochanteric area.  We are asked to admit the patient. Kofi Loredo patient's daughter is here who tells me that she had a right hip fracture last year.  At that time, she was treated at [facility] as she has a narrow trachea and anesthesiologist told them to go to [facility].  At this time, the patient has no other complaints.     X Rays performed in the ER reveal right intertrochanteric hip fracture. Orthopedic surgery consulted to further evaluate and treat the above fracture.     PAST MEDICAL HISTORY:       1.  Chronic atrial fibrillation. 2.  History of remote MI.  The patient's daughter is not sure if she had a stent  3.  Hypothyroidism. 4.  Dementia. 5.  Falls. 6.  Previous history of hip surgery.     SOCIOECONOMIC HISTORY:      The patient does not smoke or drink.  She lives at [address] [place] [address] status is do not resuscitate.     CURRENT MEDICATIONS PRIOR TO ADMISSION:       1.  Levothyroxine 100 mcg daily. 2.  Potassium 20 mEq daily.   3.  Sertraline (Zoloft) 150 mg daily.  4.  Vitamin B12 every day sublingually. 5.  Xarelto 15 mg daily for AFib. 6.  Mirtazapine 15 mg daily for insomnia. 7.  BuSpar 15 mg twice daily for anxiety. 8.  P.r.n. Zofran. 9.  Metoprolol succinate 50 mg every 24 hours. 10.  Diclofenac gel topical application. [address] 12.  Quetiapine 50 mg 1 tablet p.o. b.i.d.     REVIEW OF SYSTEMS:  Cannot be done as the patient has dementia.     Physical Exam:  GENERAL:  The patient is an 80-year-old female who is not in any acute distress, resting comfortably. VITAL SIGNS:  Reveal temperature of 98.9, blood pressure 134/78, pulse 71, respiratory rate 19, saturation is 94% on room air. HEENT:  Examination reveals pupils equally reacting to light and accommodation. NECK:  Supple.  There is no lymphadenopathy or JVD. CHEST:  Clear.  No significant wheezing or crackles. CARDIOVASCULAR SYSTEM:  S1 and S2 regular.  No murmur.  No S3.  ABDOMEN:  Examination reveals no tenderness, no guarding or rigidity.  Bowel sounds are active. EXTREMITIES:  No pedal edema.  Hip examination of the right side shows decrease in motion with tenderness. CNS:  Examination reveals the patient is pleasantly confused.  Otherwise nonfocal exam.        Assessment      Right intertrochanteric femur fracture      Plan     -Obtain medical clearance for IM nailing of right femur fracture  -Make patient NPO  -Hold Xarelto     Plan for IM nailing of right intertrochanteric femur fracture. Patient educated on risks, benefits,and post operative recovery process and elects to proceed.

## 2021-09-26 NOTE — CONSULTS
ORTHO CONSULT NOTE    Date of Consultation:  2021  Referring Physician:  Lalo Gonzalez MD  CC: R hip pain    HPI:  Kiley Ordaz is a 80 y.o. female with hx of CAD, CHF who c/o R hip pain after fall at nursing home found to have R hip fracture in the ED. Pt normally uses a walker, but was attempting to help her sister who had fallen and then fell herself; pt localized to R hip, worse with movement, sharp, achy, moderate at rest; Denies other extremity pain, back pain, ha, numbness, tingling, focal weakness. Current Anticoagulant Medications: Xarelto. No past medical history on file. No past surgical history on file. No family history on file. Social History     Tobacco Use    Smoking status: Not on file   Substance Use Topics    Alcohol use: Not on file     No Known Allergies     Review of Systems:  Per HPI. Objective:     Patient Vitals for the past 8 hrs:   BP Temp Pulse Resp SpO2 Height Weight   21 1115 134/78  71 19      21 1056 (!) 142/79 98.9 °F (37.2 °C) 72 22 94 % 5' 4\" (1.626 m) 57.5 kg (126 lb 12.2 oz)     Temp (24hrs), Av.9 °F (37.2 °C), Min:98.9 °F (37.2 °C), Max:98.9 °F (37.2 °C)      EXAM:   NAD. Pt confused, does answer some questions appropriately. Moves BUE spontaneously with NTTP long bones and joints. LLE no pain PROM, NTTP long bones and joints. Moves foot OK with SILT and CR toes < 2 secs. RLE shortened and externally rotated. Hip skin intact and soft compartments. Knee, ankle and foot NTTP. Moves foot OK with SILT and CR toes < 2 secs. Bilat calf soft and NTTP. Imaging Review:   Results from Hospital Encounter encounter on 21    XR HIP RT W OR WO PELV 2-3 VWS    Narrative  EXAM: XR HIP RT W OR WO PELV 2-3 VWS  Clinical history: Trauma, unable to bear weight  INDICATION: Trauma. COMPARISON: None.     FINDINGS: AP view of the pelvis and a frogleg lateral view of the right hip  demonstrate only displaced and comminuted intertrochanteric fracture on the  right. Status post left hip arthroplasty. Status post kyphoplasty at L5. .    Impression  Comminuted right femoral intertrochanteric fracture . Kingsley Simon Results from [place] encounter on 09/26/21    CT HEAD WO CONT    Narrative  EXAM CLINICAL HISTORY: fall  INDICATION: fall  COMPARISON: None. CT dose reduction was achieved through use of a standardized protocol tailored  for this examination and automatic exposure control for dose modulation. TECHNIQUE: Serial axial images with a collimation of 5 mm were obtained from the  skull base through the vertex  FINDINGS:  There is sulcal and ventricular prominence. Confluent periventricular and  scattered foci of hypodensity in the cerebral white matter. There is no evidence  of an acute infarction, hemorrhage, or mass-effect. There is no evidence of  midline shift or hydrocephalus. Posterior fossa structures are unremarkable. No  extra-axial collections are seen. Mastoid air cells are well pneumatized and clear. There is no evidence of depressed skull fractures of soft tissue swelling. Impression  No acute intracranial process. Imaging findings consistent with mild chronic microvascular ischemic change. There is a moderate to severe degree of cerebral atrophy. Labs:   Recent Results (from the past 24 hour(s))   SAMPLES BEING HELD    Collection Time: 09/26/21 11:15 AM   Result Value Ref Range    SAMPLES BEING HELD 1 RED     COMMENT        Add-on orders for these samples will be processed based on acceptable specimen integrity and analyte stability, which may vary by analyte.    CBC WITH AUTOMATED DIFF    Collection Time: 09/26/21 11:15 AM   Result Value Ref Range    WBC 18.4 (H) 3.6 - 11.0 K/uL    RBC 3.81 3.80 - 5.20 M/uL    HGB 11.1 (L) 11.5 - 16.0 g/dL    HCT 34.1 (L) 35.0 - 47.0 %    MCV 89.5 80.0 - 99.0 FL    MCH 29.1 26.0 - 34.0 PG    MCHC 32.6 30.0 - 36.5 g/dL    RDW 15.6 (H) 11.5 - 14.5 %    PLATELET 378 920 - 916 K/uL    MPV 12.2 8.9 - 12.9 FL    NRBC 0.0 0  WBC    ABSOLUTE NRBC 0.00 0.00 - 0.01 K/uL    NEUTROPHILS 89 (H) 32 - 75 %    LYMPHOCYTES 3 (L) 12 - 49 %    MONOCYTES 7 5 - 13 %    EOSINOPHILS 0 0 - 7 %    BASOPHILS 0 0 - 1 %    IMMATURE GRANULOCYTES 1 (H) 0.0 - 0.5 %    ABS. NEUTROPHILS 16.3 (H) 1.8 - 8.0 K/UL    ABS. LYMPHOCYTES 0.6 (L) 0.8 - 3.5 K/UL    ABS. MONOCYTES 1.3 (H) 0.0 - 1.0 K/UL    ABS. EOSINOPHILS 0.0 0.0 - 0.4 K/UL    ABS. BASOPHILS 0.0 0.0 - 0.1 K/UL    ABS. IMM. GRANS. 0.2 (H) 0.00 - 0.04 K/UL    DF SMEAR SCANNED      RBC COMMENTS NORMOCYTIC, NORMOCHROMIC     METABOLIC PANEL, COMPREHENSIVE    Collection Time: 09/26/21 11:15 AM   Result Value Ref Range    Sodium 134 (L) 136 - 145 mmol/L    Potassium 4.8 3.5 - 5.1 mmol/L    Chloride 106 97 - 108 mmol/L    CO2 24 21 - 32 mmol/L    Anion gap 4 (L) 5 - 15 mmol/L    Glucose 150 (H) 65 - 100 mg/dL    BUN 23 (H) 6 - 20 MG/DL    Creatinine 0.96 0.55 - 1.02 MG/DL    BUN/Creatinine ratio 24 (H) 12 - 20      GFR est AA >60 >60 ml/min/1.73m2    GFR est non-AA 56 (L) >60 ml/min/1.73m2    Calcium 8.8 8.5 - 10.1 MG/DL    Bilirubin, total 0.5 0.2 - 1.0 MG/DL    ALT (SGPT) 16 12 - 78 U/L    AST (SGOT) 34 15 - 37 U/L    Alk.  phosphatase 76 45 - 117 U/L    Protein, total 6.6 6.4 - 8.2 g/dL    Albumin 3.3 (L) 3.5 - 5.0 g/dL    Globulin 3.3 2.0 - 4.0 g/dL    A-G Ratio 1.0 (L) 1.1 - 2.2     URINALYSIS W/ RFLX MICROSCOPIC    Collection Time: 09/26/21 11:15 AM   Result Value Ref Range    Color YELLOW/STRAW      Appearance CLEAR CLEAR      Specific gravity 1.013 1.003 - 1.030      pH (UA) 7.0 5.0 - 8.0      Protein Negative NEG mg/dL    Glucose Negative NEG mg/dL    Ketone Negative NEG mg/dL    Bilirubin Negative NEG      Blood Negative NEG      Urobilinogen 0.2 0.2 - 1.0 EU/dL    Nitrites Negative NEG      Leukocyte Esterase Negative NEG     PTT    Collection Time: 09/26/21 11:15 AM   Result Value Ref Range    aPTT 33.3 (H) 22.1 - 31.0 sec    aPTT, therapeutic range     58.0 - 77.0 SECS   PROTHROMBIN TIME + INR    Collection Time: 09/26/21 11:15 AM   Result Value Ref Range    INR 1.4 (H) 0.9 - 1.1      Prothrombin time 14.7 (H) 9.0 - 11.1 sec   SAMPLES BEING HELD    Collection Time: 09/26/21 11:15 AM   Result Value Ref Range    SAMPLES BEING HELD 1CUP OF URINE     COMMENT        Add-on orders for these samples will be processed based on acceptable specimen integrity and analyte stability, which may vary by analyte. EKG, 12 LEAD, INITIAL    Collection Time: 09/26/21 11:55 AM   Result Value Ref Range    Ventricular Rate 68 BPM    Atrial Rate 68 BPM    P-R Interval 234 ms    QRS Duration 92 ms    Q-T Interval 440 ms    QTC Calculation (Bezet) 467 ms    Calculated P Axis 78 degrees    Calculated R Axis 56 degrees    Calculated T Axis 51 degrees    Diagnosis       Sinus rhythm with 1st degree AV block  Low voltage QRS  Borderline ECG  No previous ECGs available     TYPE & SCREEN    Collection Time: 09/26/21 12:28 PM   Result Value Ref Range    Crossmatch Expiration 09/29/2021,2359     ABO/Rh(D) Martsumitn Sharps NEGATIVE     Antibody screen NEG    COVID-19 RAPID TEST    Collection Time: 09/26/21 12:50 PM   Result Value Ref Range    Specimen source Nasopharyngeal      COVID-19 rapid test Not detected NOTD         Impression:     Patient Active Problem List    Diagnosis Date Noted    Hip fracture ([facility] 09/26/2021     Active Problems:    Hip fracture ([facility] (9/26/2021)        Plan:   I explained the nature of the injury and discussed the recommended surgery. I discussed potential risks/benefits/alternatives of surgery as well as expected hospital course. Patients daughter consents. Plan for Right Hip IM Nail on 9/26 when cleared. Medical evaluation/clearance pending - hospitalist placed consult for preoperative Cardiology eval.  Bedrest.  NPO now  Ice, Morphine, Tylenol  SCDs OK. Hold chem DVT ppx    Dr. Kristina Dong is aware and agrees with above plan.       Emma Showers, [address] [place] [facility]

## 2021-09-26 NOTE — CONSULTS
Darrel Rios M.D., CEMLilia  [phone]        NAME: Edi Kolb   :  1940   MRN:  [id number]  Date/Time:  13:27 PM      ________________________________________________________________________  HPI    80years old lady with the past medical history remarkable for dementia, hypertension, coronary artery disease and paroxysmal atrial fibrillation on Xarelto. She had a PCI 15 of 20 years ago as per her daughter statement. She is admitted secondary to a accidental fall resulting in closed fracture of the right hip. The patient was trying to help her sister with fall and also broken her hip when she fell herself. Her daughter tells me that the patient has not been complaining of any chest pain or shortness of breath and recent or remote past.  The patient herself tells me that she has had no chest pain or shortness of breath. Assessment/Plan  1. Preop: The patient seems to be seemingly asymptomatic cardiac wise. Her electrocardiogram reveals a normal sinus rhythm without acute ischemic changes. At this point the patient is at an acceptable cardiac risks to proceed with pinning of right hip fracture. I would recommend continue metoprolol with no interruptions. If the patient has not received metoprolol today I will proceed with metoprolol p.o. her regular dose prior to surgery. I have discussed with the patient's daughter risks and benefits. The daughter is aware of potential higher risk of surgery and but also agreeable to proceed. Concerning atrial fibrillation history the patient currently is in normal sinus rhythm. Xarelto on hold. I have discussed with her daughter long-term use of Xarelto. The patient does not seem to have frequent falls. We may consider resuming Xarelto after her surgery. She does have a systolic murmur 2 out of 6. Her S2 is nonetheless preserved. I doubt any severe aortic stenosis present at this time but at some point she should proceed with an echocardiogram.         ICD-10-CM ICD-9-CM    1. Dementia without behavioral disturbance, unspecified dementia type ([facility]  F03.90 294.20    2. Closed fracture of right hip, initial encounter ([facility]  S72.001A 820.8     Comminuted intertrochanteric          CARDIAC STUDIES                         EKG: normal sinus rhythm. IMAGING      CT Results (most recent):  Results from Hospital Encounter encounter on 09/26/21    CT HEAD WO CONT    Narrative  EXAM CLINICAL HISTORY: fall  INDICATION: fall  COMPARISON: None. CT dose reduction was achieved through use of a standardized protocol tailored  for this examination and automatic exposure control for dose modulation. TECHNIQUE: Serial axial images with a collimation of 5 mm were obtained from the  skull base through the vertex  FINDINGS:  There is sulcal and ventricular prominence. Confluent periventricular and  scattered foci of hypodensity in the cerebral white matter. There is no evidence  of an acute infarction, hemorrhage, or mass-effect. There is no evidence of  midline shift or hydrocephalus. Posterior fossa structures are unremarkable. No  extra-axial collections are seen. Mastoid air cells are well pneumatized and clear. There is no evidence of depressed skull fractures of soft tissue swelling. Impression  No acute intracranial process. Imaging findings consistent with mild chronic microvascular ischemic change. There is a moderate to severe degree of cerebral atrophy. XR Results (most recent):  Results from Hospital Encounter encounter on 09/26/21    XR HIP RT W OR WO PELV 2-3 VWS    Narrative  EXAM: XR HIP RT W OR WO PELV 2-3 VWS  Clinical history: Trauma, unable to bear weight  INDICATION: Trauma. COMPARISON: None.     FINDINGS: AP view of the pelvis and a frogleg lateral view of the right hip  demonstrate only displaced and comminuted intertrochanteric fracture on the  right. Status post left hip arthroplasty. Status post kyphoplasty at L5. .    Impression  Comminuted right femoral intertrochanteric fracture . [facility] Billing MRI Results (most recent):  No results found for this or any previous visit. Subjective:   CHIEF COMPLAINT:  \"preop\"  Pertinent items are noted in HPI. HISTORY OF PRESENT ILLNESS:     Carlos Duque is a 80 y.o. female whom presents with complaint noted above. We were asked to evaluation for the above problems. Evelyn Strong  No past medical history on file. No past surgical history on file. Social History     Tobacco Use    Smoking status: Not on file   Substance Use Topics    Alcohol use: Not on file      No family history on file. No Known Allergies        No current facility-administered medications for this encounter. No current outpatient medications on file. Prior to Admission medications    Not on File                Objective:   VITALS:      Patient Vitals for the past 12 hrs:   Temp Pulse Resp BP SpO2   09/26/21 1115  71 19 134/78    09/26/21 1056 98.9 °F (37.2 °C) 72 22 (!) 142/79 94 %        Visit Vitals  /78   Pulse 71   Temp 98.9 °F (37.2 °C)   Resp 19   Ht 5' 4\" (1.626 m)   Wt 126 lb 12.2 oz (57.5 kg)   SpO2 94%   BMI 21.76 kg/m²         Extended / Orthostatic Vitals: Wt Readings from Last 3 Encounters:   09/26/21 126 lb 12.2 oz (57.5 kg)       No intake or output data in the 24 hours ending 09/26/21 1527       Neck: no JVD  Heart: regular rate and rhythm, systolic murmur: systolic ejection 2/6, crescendo at 2nd left intercostal space, at 2nd right intercostal space  Lungs: clear to auscultation bilaterally  Abdomen: soft, non-tender.  Bowel sounds normal. No masses,  no organomegaly  Extremities: no edema          LAB DATA REVIEWED:      All Cardiac Markers in the last 24 hours:  No results found for: CPK, CK, CKMMB, CKMB, RCK3, CKMBT, CKMBPOC, CKNDX, CKND1, BRADLEY, TROPT, TROIQ, ELIA, TROPT, TNIPOC, BNP, BNPP, BNPNT      Recent Results (from the past 24 hour(s))   SAMPLES BEING HELD    Collection Time: 09/26/21 11:15 AM   Result Value Ref Range    SAMPLES BEING HELD 1 RED     COMMENT        Add-on orders for these samples will be processed based on acceptable specimen integrity and analyte stability, which may vary by analyte. CBC WITH AUTOMATED DIFF    Collection Time: 09/26/21 11:15 AM   Result Value Ref Range    WBC 18.4 (H) 3.6 - 11.0 K/uL    RBC 3.81 3.80 - 5.20 M/uL    HGB 11.1 (L) 11.5 - 16.0 g/dL    HCT 34.1 (L) 35.0 - 47.0 %    MCV 89.5 80.0 - 99.0 FL    MCH 29.1 26.0 - 34.0 PG    MCHC 32.6 30.0 - 36.5 g/dL    RDW 15.6 (H) 11.5 - 14.5 %    PLATELET 328 540 - 238 K/uL    MPV 12.2 8.9 - 12.9 FL    NRBC 0.0 0  WBC    ABSOLUTE NRBC 0.00 0.00 - 0.01 K/uL    NEUTROPHILS 89 (H) 32 - 75 %    LYMPHOCYTES 3 (L) 12 - 49 %    MONOCYTES 7 5 - 13 %    EOSINOPHILS 0 0 - 7 %    BASOPHILS 0 0 - 1 %    IMMATURE GRANULOCYTES 1 (H) 0.0 - 0.5 %    ABS. NEUTROPHILS 16.3 (H) 1.8 - 8.0 K/UL    ABS. LYMPHOCYTES 0.6 (L) 0.8 - 3.5 K/UL    ABS. MONOCYTES 1.3 (H) 0.0 - 1.0 K/UL    ABS. EOSINOPHILS 0.0 0.0 - 0.4 K/UL    ABS. BASOPHILS 0.0 0.0 - 0.1 K/UL    ABS. IMM. GRANS. 0.2 (H) 0.00 - 0.04 K/UL    DF SMEAR SCANNED      RBC COMMENTS NORMOCYTIC, NORMOCHROMIC     METABOLIC PANEL, COMPREHENSIVE    Collection Time: 09/26/21 11:15 AM   Result Value Ref Range    Sodium 134 (L) 136 - 145 mmol/L    Potassium 4.8 3.5 - 5.1 mmol/L    Chloride 106 97 - 108 mmol/L    CO2 24 21 - 32 mmol/L    Anion gap 4 (L) 5 - 15 mmol/L    Glucose 150 (H) 65 - 100 mg/dL    BUN 23 (H) 6 - 20 MG/DL    Creatinine 0.96 0.55 - 1.02 MG/DL    BUN/Creatinine ratio 24 (H) 12 - 20      GFR est AA >60 >60 ml/min/1.73m2    GFR est non-AA 56 (L) >60 ml/min/1.73m2    Calcium 8.8 8.5 - 10.1 MG/DL    Bilirubin, total 0.5 0.2 - 1.0 MG/DL    ALT (SGPT) 16 12 - 78 U/L    AST (SGOT) 34 15 - 37 U/L    Alk.  phosphatase 76 45 - 117 U/L Protein, total 6.6 6.4 - 8.2 g/dL    Albumin 3.3 (L) 3.5 - 5.0 g/dL    Globulin 3.3 2.0 - 4.0 g/dL    A-G Ratio 1.0 (L) 1.1 - 2.2     URINALYSIS W/ RFLX MICROSCOPIC    Collection Time: 09/26/21 11:15 AM   Result Value Ref Range    Color YELLOW/STRAW      Appearance CLEAR CLEAR      Specific gravity 1.013 1.003 - 1.030      pH (UA) 7.0 5.0 - 8.0      Protein Negative NEG mg/dL    Glucose Negative NEG mg/dL    Ketone Negative NEG mg/dL    Bilirubin Negative NEG      Blood Negative NEG      Urobilinogen 0.2 0.2 - 1.0 EU/dL    Nitrites Negative NEG      Leukocyte Esterase Negative NEG     PTT    Collection Time: 09/26/21 11:15 AM   Result Value Ref Range    aPTT 33.3 (H) 22.1 - 31.0 sec    aPTT, therapeutic range     58.0 - 77.0 SECS   PROTHROMBIN TIME + INR    Collection Time: 09/26/21 11:15 AM   Result Value Ref Range    INR 1.4 (H) 0.9 - 1.1      Prothrombin time 14.7 (H) 9.0 - 11.1 sec   SAMPLES BEING HELD    Collection Time: 09/26/21 11:15 AM   Result Value Ref Range    SAMPLES BEING HELD 1CUP OF URINE     COMMENT        Add-on orders for these samples will be processed based on acceptable specimen integrity and analyte stability, which may vary by analyte.    EKG, 12 LEAD, INITIAL    Collection Time: 09/26/21 11:55 AM   Result Value Ref Range    Ventricular Rate 68 BPM    Atrial Rate 68 BPM    P-R Interval 234 ms    QRS Duration 92 ms    Q-T Interval 440 ms    QTC Calculation (Bezet) 467 ms    Calculated P Axis 78 degrees    Calculated R Axis 56 degrees    Calculated T Axis 51 degrees    Diagnosis       Sinus rhythm with 1st degree AV block  Low voltage QRS  No previous ECGs available  Confirmed by Lashawn Courser ([id number]) on 9/26/2021 2:28:32 PM     TYPE & SCREEN    Collection Time: 09/26/21 12:28 PM   Result Value Ref Range    Crossmatch Expiration 09/29/2021,2359     ABO/Rh(D) Erick Sages NEGATIVE     Antibody screen NEG    COVID-19 RAPID TEST    Collection Time: 09/26/21 12:50 PM   Result Value Ref Range    Specimen source Nasopharyngeal      COVID-19 rapid test Not detected NOTD         No results found for: CHOL, CHOLPOCT, CHOLX, CHLST, CHOLV, HDL, HDLPOC, HDLP, LDL, LDLCPOC, LDLC, DLDLP, VLDLC, VLDL, TGLX, TRIGL, TRIGP, TGLPOCT, CHHD, CHHDX          Procedures: see electronic medical records for all procedures/Xrays and details which were not copied into this note but were reviewed prior to creation of Plan. Please note that this dictation was completed with Flipter, the [employer] voice recognition software. Quite often unanticipated grammatical, syntax, homophones, and other interpretive errors are inadvertently transcribed by the computer software. Please disregard these errors. Please excuse any errors that have escaped final proofreading.           Care Plan discussed with:  Patient    Family x   RN    Care Manager    Consultant/Specialist:     ________________________    ______________________________________________      Signed By: Maureen Bishop MD     September 26, 2021

## 2021-09-26 NOTE — PROGRESS NOTES
Primary Nurse Kevin Buckley and Herlinda Schmidt, RN performed a dual skin assessment on this patient No impairment noted  Philip score is 15

## 2021-09-26 NOTE — BRIEF OP NOTE
Brief Postoperative Note    Patient: Kalin Kolb  YOB: 1940  MRN: [id number]    Date of Procedure: 9/26/2021     Pre-Op Diagnosis: Fractured right hip    Post-Op Diagnosis: Intertrochanteric femur fx      Procedure(s):  [place] gamma nail. Tonya Beck tableC arm    Surgeon(s):  Michaela Tirado MD    Surgical Assistant: None    Anesthesia: General     Estimated Blood Loss (mL): less than 677     Complications: None    Specimens: * No specimens in log *     Implants:   Implant Name Type Inv.  Item Serial No.  Lot No. LRB No. Used Action   NAIL TROCH SPARKLE 3 125D [id number] -- STRL - SN/A  NAIL TROCH SPARKLE 3 [id number] -- STRL N/A [employer] [id number] Right 1 Implanted   SCR BNE LAG GAMMA 3 10.5X95MM -- TI STRL - SN/A  SCR BNE LAG GAMMA 3 10.5X95MM -- TI STRL N/A [employer] [id number] Right 1 Implanted   SCR BNE LCK T2 FT 5X35MM TI -- STRL - SN/A  SCR BNE LCK T2 FT 5X35MM TI -- STRL N/A [employer] [id number] Right 1 Implanted       Drains: * No LDAs found *    Findings: displaced intertrochanteric femur fx      Electronically Signed by Bro Murcia MD on 9/26/2021 at 6:09 PM

## 2021-09-26 NOTE — ROUTINE PROCESS
Patient: Gris Hidalgo MRN: [id number]  SSN: [id number]   YOB: 1940  Age: 80 y.o. Sex: female     Patient is status post Procedure(s):  [place] gamma nail. [place] tableC arm.     Surgeon(s) and Role:     * Irasema Polk MD - Primary    Local/Dose/Irrigation:                   Peripheral IV 09/26/21 Left Antecubital (Active)   Site Assessment Clean, dry, & intact 09/26/21 1712   Phlebitis Assessment 0 09/26/21 1712   Infiltration Assessment 0 09/26/21 1712   Dressing Status Clean, dry, & intact 09/26/21 1712   Dressing Type Transparent;Elastic bandage 09/26/21 1645   Hub Color/Line Status Capped 09/26/21 1645   Action Taken Open ports on tubing capped 09/26/21 1645   Alcohol Cap Used Yes 09/26/21 1645            Airway - Endotracheal Tube 09/26/21 Oral (Active)                   Dressing/Packing:  Incision 09/26/21 Hip Right-Dressing/Treatment: Alginate with Ag (09/26/21 7059)    Splint/Cast:  ]    Other:

## 2021-09-26 NOTE — ROUTINE PROCESS
TRANSFER - OUT REPORT:    Verbal report given to Rena Rosas RN (name) on Renae Johnston  being transferred to  (unit) for routine progression of care       Report consisted of patients Situation, Background, Assessment and   Recommendations(SBAR). Information from the following report(s) SBAR, Kardex, ED Summary, MAR, Accordion and Recent Results was reviewed with the receiving nurse. Lines:   Peripheral IV 09/26/21 Left Antecubital (Active)   Site Assessment Clean, dry, & intact 09/26/21 1248   Phlebitis Assessment 0 09/26/21 1248   Infiltration Assessment 0 09/26/21 1248   Dressing Status Clean, dry, & intact 09/26/21 1248   Hub Color/Line Status Green 09/26/21 1248   Action Taken Blood drawn 09/26/21 1248        Opportunity for questions and clarification was provided.       Patient transported with:   mobli

## 2021-09-26 NOTE — ED TRIAGE NOTES
Pt arrived with EMS from a SNF ([facility]) c/o of  R hip pain s/p GLF last night. Hx of Dementia, currently A&O X 4. VSS. Pt not on Blood thinner, Denies LOC post fall.

## 2021-09-26 NOTE — PROGRESS NOTES
TRANSFER - IN REPORT:    Verbal report received from Korin Rosas, [address] [place] [address] (name) on Maral Dewitt  being received from PACU (unit) for routine post - op      Report consisted of patients Situation, Background, Assessment and   Recommendations(SBAR). Information from the following report(s) SBAR, ED Summary, OR Summary, Procedure Summary, MAR and Recent Results was reviewed with the receiving nurse. Opportunity for questions and clarification was provided. Assessment completed upon patients arrival to unit and care assumed.

## 2021-09-27 LAB
ANION GAP SERPL CALC-SCNC: 4 MMOL/L (ref 5–15)
BUN SERPL-MCNC: 23 MG/DL (ref 6–20)
BUN/CREAT SERPL: 28 (ref 12–20)
CALCIUM SERPL-MCNC: 8.4 MG/DL (ref 8.5–10.1)
CHLORIDE SERPL-SCNC: 108 MMOL/L (ref 97–108)
CO2 SERPL-SCNC: 23 MMOL/L (ref 21–32)
CREAT SERPL-MCNC: 0.81 MG/DL (ref 0.55–1.02)
ERYTHROCYTE [DISTWIDTH] IN BLOOD BY AUTOMATED COUNT: 15.5 % (ref 11.5–14.5)
GLUCOSE BLD STRIP.AUTO-MCNC: 133 MG/DL (ref 65–117)
GLUCOSE SERPL-MCNC: 139 MG/DL (ref 65–100)
HCT VFR BLD AUTO: 27.6 % (ref 35–47)
HGB BLD-MCNC: 8.6 G/DL (ref 11.5–16)
MCH RBC QN AUTO: 28.6 PG (ref 26–34)
MCHC RBC AUTO-ENTMCNC: 31.2 G/DL (ref 30–36.5)
MCV RBC AUTO: 91.7 FL (ref 80–99)
NRBC # BLD: 0 K/UL (ref 0–0.01)
NRBC BLD-RTO: 0 PER 100 WBC
PLATELET # BLD AUTO: 227 K/UL (ref 150–400)
PMV BLD AUTO: 11.8 FL (ref 8.9–12.9)
POTASSIUM SERPL-SCNC: 4.6 MMOL/L (ref 3.5–5.1)
RBC # BLD AUTO: 3.01 M/UL (ref 3.8–5.2)
SERVICE CMNT-IMP: ABNORMAL
SODIUM SERPL-SCNC: 135 MMOL/L (ref 136–145)
WBC # BLD AUTO: 16 K/UL (ref 3.6–11)

## 2021-09-27 PROCEDURE — 77010033678 HC OXYGEN DAILY

## 2021-09-27 PROCEDURE — 74011250637 HC RX REV CODE- 250/637: Performed by: INTERNAL MEDICINE

## 2021-09-27 PROCEDURE — 97165 OT EVAL LOW COMPLEX 30 MIN: CPT

## 2021-09-27 PROCEDURE — 36415 COLL VENOUS BLD VENIPUNCTURE: CPT

## 2021-09-27 PROCEDURE — 65270000029 HC RM PRIVATE

## 2021-09-27 PROCEDURE — 74011250637 HC RX REV CODE- 250/637: Performed by: STUDENT IN AN ORGANIZED HEALTH CARE EDUCATION/TRAINING PROGRAM

## 2021-09-27 PROCEDURE — 74011000250 HC RX REV CODE- 250: Performed by: STUDENT IN AN ORGANIZED HEALTH CARE EDUCATION/TRAINING PROGRAM

## 2021-09-27 PROCEDURE — 97161 PT EVAL LOW COMPLEX 20 MIN: CPT

## 2021-09-27 PROCEDURE — 85027 COMPLETE CBC AUTOMATED: CPT

## 2021-09-27 PROCEDURE — 74011250636 HC RX REV CODE- 250/636: Performed by: STUDENT IN AN ORGANIZED HEALTH CARE EDUCATION/TRAINING PROGRAM

## 2021-09-27 PROCEDURE — 99222 1ST HOSP IP/OBS MODERATE 55: CPT | Performed by: INTERNAL MEDICINE

## 2021-09-27 PROCEDURE — 97530 THERAPEUTIC ACTIVITIES: CPT

## 2021-09-27 PROCEDURE — 97535 SELF CARE MNGMENT TRAINING: CPT

## 2021-09-27 PROCEDURE — 82962 GLUCOSE BLOOD TEST: CPT

## 2021-09-27 PROCEDURE — 74011250637 HC RX REV CODE- 250/637: Performed by: HOSPITALIST

## 2021-09-27 PROCEDURE — 80048 BASIC METABOLIC PNL TOTAL CA: CPT

## 2021-09-27 RX ADMIN — LEVOTHYROXINE SODIUM 100 MCG: 0.1 TABLET ORAL at 06:53

## 2021-09-27 RX ADMIN — BUSPIRONE HYDROCHLORIDE 15 MG: 10 TABLET ORAL at 20:10

## 2021-09-27 RX ADMIN — CEFAZOLIN SODIUM 2 G: 1 INJECTION, POWDER, FOR SOLUTION INTRAMUSCULAR; INTRAVENOUS at 20:52

## 2021-09-27 RX ADMIN — Medication 10 ML: at 14:00

## 2021-09-27 RX ADMIN — OXYCODONE AND ACETAMINOPHEN 1 TABLET: 5; 325 TABLET ORAL at 10:20

## 2021-09-27 RX ADMIN — SERTRALINE 150 MG: 50 TABLET, FILM COATED ORAL at 10:20

## 2021-09-27 RX ADMIN — QUETIAPINE FUMARATE 50 MG: 25 TABLET ORAL at 20:51

## 2021-09-27 RX ADMIN — OXYCODONE 5 MG: 5 TABLET ORAL at 00:00

## 2021-09-27 RX ADMIN — QUETIAPINE FUMARATE 50 MG: 25 TABLET ORAL at 10:20

## 2021-09-27 RX ADMIN — Medication 5 ML: at 06:00

## 2021-09-27 RX ADMIN — POLYETHYLENE GLYCOL 3350 17 G: 17 POWDER, FOR SOLUTION ORAL at 10:21

## 2021-09-27 RX ADMIN — DOCUSATE SODIUM 50 MG AND SENNOSIDES 8.6 MG 1 TABLET: 8.6; 5 TABLET, FILM COATED ORAL at 20:10

## 2021-09-27 RX ADMIN — FAMOTIDINE 20 MG: 20 TABLET ORAL at 20:10

## 2021-09-27 RX ADMIN — DOCUSATE SODIUM 50 MG AND SENNOSIDES 8.6 MG 1 TABLET: 8.6; 5 TABLET, FILM COATED ORAL at 10:20

## 2021-09-27 RX ADMIN — CALCIUM CARBONATE-VITAMIN D TAB 500 MG-200 UNIT 1 TABLET: 500-200 TAB at 20:51

## 2021-09-27 RX ADMIN — POTASSIUM CHLORIDE 20 MEQ: 750 TABLET, FILM COATED, EXTENDED RELEASE ORAL at 10:21

## 2021-09-27 RX ADMIN — RIVAROXABAN 20 MG: 20 TABLET, FILM COATED ORAL at 20:52

## 2021-09-27 RX ADMIN — OXYCODONE 5 MG: 5 TABLET ORAL at 20:10

## 2021-09-27 RX ADMIN — LEVOTHYROXINE SODIUM 100 MCG: 0.1 TABLET ORAL at 06:56

## 2021-09-27 RX ADMIN — MIRTAZAPINE 15 MG: 15 TABLET, FILM COATED ORAL at 21:23

## 2021-09-27 RX ADMIN — Medication 10 ML: at 21:23

## 2021-09-27 RX ADMIN — CEFAZOLIN SODIUM 2 G: 1 INJECTION, POWDER, FOR SOLUTION INTRAMUSCULAR; INTRAVENOUS at 06:53

## 2021-09-27 NOTE — PROGRESS NOTES
RUR: 16% Low    ROCHELLE: Pending SNF placements. Referrals sent to [address] [address] (1st choice), [facility] (2nd choice), and [facility] (3rd choice) via [employer]. Referral send to [facility] (4th choice) via [facility]. S transport upon discharge. Follow up with ortho. Primary Contact: DaughterNatalie, [phone]    Medicare pt has received, reviewed, and signed 1st IM letter informing them of their right to appeal the discharge. Signed copied has been placed on pt bedside chart. Care Management Interventions  PCP Verified by CM:  Yes (Dr. Petr Sue, last seen 6 months ago)  Palliative Care Criteria Met (RRAT>21 & CHF Dx)?: No  Mode of Transport at Discharge: S  Transition of Care Consult (CM Consult): SNF  Partner SNF: Yes  MyChart Signup: No  Discharge Durable Medical Equipment: No  Health Maintenance Reviewed: Yes  Physical Therapy Consult: Yes  Occupational Therapy Consult: Yes  Speech Therapy Consult: No  Support Systems: Child(juan)  Confirm Follow Up Transport: Family  The Plan for Transition of Care is Related to the Following Treatment Goals : SNF   The Patient and/or Patient Representative was Provided with a Choice of Provider and Agrees with the Discharge Plan?: Yes  Name of the Patient Representative Who was Provided with a Choice of Provider and Agrees with the Discharge Plan: Patient's daughterNatalie   Freedom of Choice List was Provided with Basic Dialogue that Supports the Patient's Individualized Plan of Care/Goals, Treatment Preferences and Shares the Quality Data Associated with the Providers?: Yes  [place]raham Resource Information Provided?: No  Discharge Location  Discharge Placement: Skilled nursing facility    Reason for Admission: Hip fracture                       RUR Score:   16% Low                   Plan for utilizing home health: SNF     Transition of Care Plan:     The Plan for Transition of Care is related to the following treatment goals: SNF     The patient's daughter was provided with a choice of provider and agrees  with the discharge plan. Yes [x] No []    A Freedom of choice list was provided with basic dialogue that supports the patient's individualized plan of care/goals and shares the quality data associated with the providers. Yes [x] No []           PCP: First and Last name:  Nickolas Way MD     Name of Practice:    Are you a current patient: Yes/No: Yes   Approximate date of last visit: 6 months ago   Can you participate in a virtual visit with PCP: Yes                     Current Advanced Directive/Advance Care Plan: DNR      Healthcare Decision Maker:   Click here to complete [address] including selection of the Healthcare Decision Maker Relationship (ie \"Primary\")             Primary Decision Maker: Suyapa Mckinney - Daughter - [phone]                  Transition of Care Plan:  SNF                 CM met with patient and patient's daughter at bedside to introduce self and explain role. Patient was lethargic, sleeping in bed. Per daughter, patient lives at [facility] in the [address] [address]. Prior to hospitalization patient was modified independent with ADL's. Patient was ambulating with the use of a rollator. Patient has grab bars and a raised toilet seat. [facility] is responsible for IADL's. CM verified demographics, PCP, and insurance. Patient uses pharmacy within [facility]. CM provided daughter list of SNF choices. Per daughter's request SNF referrals sent to [address] [address] (1st choice), [facility] (2nd choice), and [facility] (3rd choice) via [employer]. Referral send to [facility] (4th choice) via [facility]. CM awaiting responses.      Asim Solis, DEEP   [phone]

## 2021-09-27 NOTE — PROGRESS NOTES
Ortho Daily Progress Note      Patient: Rufina Lewis                   MRN: [id number]  Sex: female  YOB: 1940           Age: 80 y.o.    1 Day Post-Op    Procedure(s):  Alli gamma nail. Soledad tableC arm    Subjective: no complaints, pain controlled       Visit Vitals  BP 97/60 (BP 1 Location: Right upper arm, BP Patient Position: At rest)   Pulse 89   Temp 98 °F (36.7 °C)   Resp 18   Ht 5' 4\" (1.626 m)   Wt 57.5 kg (126 lb 12.2 oz)   SpO2 92%   BMI 21.76 kg/m²        Lab Results:  HGB   Date/Time Value Ref Range Status   09/27/2021 03:18 AM 8.6 (L) 11.5 - 16.0 g/dL Final     INR   Date/Time Value Ref Range Status   09/26/2021 11:15 AM 1.4 (H) 0.9 - 1.1   Final     Comment:     A single therapeutic range for Vit K antagonists may not be optimal for all indications - see June, 2008 issue of Chest, American College of Chest Physicians Evidence-Based Clinical Practice Guidelines, 8th Edition.        Physical Exam:    GENERAL: 79yo female, alert, cooperative, no distress, daughter at bedside  DRESSING: Aquacel dressing right hip c/d/i, peeling a bit inferiorly  SWELLING: mild  NEUROLOGICAL: intact  PULSE:yes     MOTION: Normal right hip ROM with mild pain  DVT Exam: No evidence of DVT seen on physical exam.      Plan:    Tape to inferior portion of Aquacel, leave Aquacel in place x 1 week  DVT prophylaxisXarelto  Weight bearing restrictionWBAT  Pain Control:stable, mild-to-moderate joint symptoms intermittently, reasonably well controlled by current meds  Anticipate rehab/SNF    NATALIE Dyson  9/27/2021   10:21 AM      .

## 2021-09-27 NOTE — PROGRESS NOTES
Unable to complete admission due to patient drowsy/sleepy. Responds to her name, unable to answer questions. Ice pack to right hip. Vital signs stable. Will continue to monitor.

## 2021-09-27 NOTE — PROGRESS NOTES
Problem: Self Care Deficits Care Plan (Adult)  Goal: *Acute Goals and Plan of Care (Insert Text)  Description:   FUNCTIONAL STATUS PRIOR TO ADMISSION: Pt resides at [address] [place] [address] within memory care unit, with ADL assist provided by staffing. HOME SUPPORT: The patient lived with LTC staffing to provide ADL/IADL assist.  Supportive daughter    Occupational Therapy Goals  Initiated 9/27/2021  1. Patient will perform seated self-feeding with supervision/set-up within 7 day(s). 2.  Patient will perform seated grooming with supervision/set-up within 7 day(s). 3.  Patient will perform seated upper body dressing with supervision/set-up within 7 day(s). 4.  Patient will perform toilet transfers, EOB to [facility], with moderate assistance  within 7 day(s). 5.  Patient will perform all aspects of toileting, seated BSC, with moderate assistance  within 7 day(s). Outcome: Not Met     OCCUPATIONAL THERAPY EVALUATION  Patient: Haja Kolb (50 y.o. female)  Date: 9/27/2021  Primary Diagnosis: Hip fracture ([facility] [address] [S72.009A]  Procedure(s) (LRB):  [place] gamma nail. Soledad tableC arm (Right) 1 Day Post-Op   Precautions:  Fall, WBAT    ASSESSMENT  Based on the objective data described below, the patient presents with decreased problem solving/sequencing/safety, decreased functional mobility/balance, decreased full body reaching, decreased activity tolerance, high amounts of RLE pain with movement and decreased strength/endurance, all of which limit pt's ability to safely complete self-care routine. Pt required Total A to come to/from EOB, with Total A to briefly maintain EOB balance, with output limited by RLE pain, and pt requiring Max A to/from bedpan, with Total A for clothing management and hygiene. Pt benefits from [address] A for self-feeding, Mod A for UB dressing, Max A for grooming/bathing and Total A for LE dressing/toileting.   Pt benefits from skilled OT to address functional deficits during acute hospitalization, with reporting therapist believing pt would benefit from SNF level rehab upon discharge. Current Level of Function Impacting Discharge (ADLs/self-care): Total A    Functional Outcome Measure: The patient scored 0/100 on the Barthel Index outcome measure. Other factors to consider for discharge: RLE pain     Patient will benefit from skilled therapy intervention to address the above noted impairments. PLAN :  Recommendations and Planned Interventions: self care training, functional mobility training, therapeutic exercise, balance training, therapeutic activities, endurance activities, and patient education    Frequency/Duration: Patient will be followed by occupational therapy 5 times a week to address goals. Recommendation for discharge: (in order for the patient to meet his/her long term goals)  Therapy up to 5 days/week in SNF setting    This discharge recommendation:  Has been made in collaboration with the attending provider and/or case management    IF patient discharges home will need the following DME: TBD       SUBJECTIVE:   Patient stated that's my friend.  re: photograph on her bedside table    OBJECTIVE DATA SUMMARY:   HISTORY:   Past Medical History:   Diagnosis Date    Arrhythmia     Paroxysmal AFib    CAD (coronary artery disease)     Hypertension     Psychiatric disorder     dementia   History reviewed. No pertinent surgical history. Expanded or extensive additional review of patient history:     Home Situation  Home Environment: Long term care ([place])  Living Alone: No  Support Systems: Child(juan), [place]  Patient Expects to be Discharged to[de-identified] Skilled nursing facility    Hand dominance: Right    EXAMINATION OF PERFORMANCE DEFICITS:  Cognitive/Behavioral Status:  Neurologic State: Alert;Confused  Orientation Level: Oriented to person;Disoriented to place; Disoriented to situation;Disoriented to time  Cognition: Decreased attention/concentration;Decreased command following;Memory loss;Poor safety awareness     Perseveration: No perseveration noted  Safety/Judgement: Decreased awareness of environment;Decreased awareness of need for assistance;Decreased awareness of need for safety;Decreased insight into deficits    Hearing: Auditory  Auditory Impairment: None    Vision/Perceptual:                                Corrective Lenses: Glasses    Range of Motion:  AROM: Generally decreased, functional (BUE)  PROM: Generally decreased, functional (BUE)                      Strength:  Strength: Generally decreased, functional (BUE)                Coordination:  Coordination: Generally decreased, functional (BUE)  Fine Motor Skills-Upper: Left Intact; Right Intact    Gross Motor Skills-Upper: Left Intact; Right Intact    Tone & Sensation:  Tone: Normal  Sensation: Intact                      Balance:  Sitting: Impaired; With support  Sitting - Static: Poor (constant support)    Functional Mobility and Transfers for ADLs:  Bed Mobility:  Rolling: Maximum assistance  Supine to Sit: Total assistance  Sit to Supine: Total assistance    Transfers:       ADL Assessment:  Feeding: Minimum assistance    Oral Facial Hygiene/Grooming: Maximum assistance    Bathing: Maximum assistance    Upper Body Dressing: Moderate assistance    Lower Body Dressing: Total assistance    Toileting: Total assistance      ADL Intervention and task modifications:  Cognitive Retraining  Safety/Judgement: Decreased awareness of environment;Decreased awareness of need for assistance;Decreased awareness of need for safety;Decreased insight into deficits    Functional Measure:  Barthel Index:    Bathin  Bladder: 0  Bowels: 0  Groomin  Dressin  Feedin  Mobility: 0  Stairs: 0  Toilet Use: 0  Transfer (Bed to Chair and Back): 0  Total: 0/100        The Barthel ADL Index: Guidelines  1. The index should be used as a record of what a patient does, not as a record of what a patient could do.   2. The main aim is to establish degree of independence from any help, physical or verbal, however minor and for whatever reason. 3. The need for supervision renders the patient not independent. 4. A patient's performance should be established using the best available evidence. Asking the patient, friends/relatives and nurses are the usual sources, but direct observation and common sense are also important. However direct testing is not needed. 5. Usually the patient's performance over the preceding 24-48 hours is important, but occasionally longer periods will be relevant. 6. Middle categories imply that the patient supplies over 50 per cent of the effort. 7. Use of aids to be independent is allowed. Antonio Hill., Barthel, DLiliaW. (286). Functional evaluation: the Barthel Index. [facility] (14)2. ASHA Prado, Aileen Wong., Sergey Griffin., [place], [address] (1999). Measuring the change indisability after inpatient rehabilitation; comparison of the responsiveness of the Barthel Index and Functional [place] Measure. Journal of Neurology, Neurosurgery, and Psychiatry, 66(4), 285-676. Oral Demetrius, N.J.A, DAINA Mariscal, & Odalys Tenorio, M.A. (2004.) Assessment of post-stroke quality of life in cost-effectiveness studies: The usefulness of the Barthel Index and the EuroQoL-5D.  Quality of Life Research, 15, 515-34        Occupational Therapy Evaluation Charge Determination   History Examination Decision-Making   LOW Complexity : Brief history review  HIGH Complexity : 5 or more performance deficits relating to physical, cognitive , or psychosocial skils that result in activity limitations and / or participation restrictions LOW Complexity : No comorbidities that affect functional and no verbal or physical assistance needed to complete eval tasks       Based on the above components, the patient evaluation is determined to be of the following complexity level: LOW   Pain Rating:  RLE pain with movement, did not quantify; nursing aware and following    Activity Tolerance:   Poor and requires frequent rest breaks    After treatment patient left in no apparent distress:    Supine in bed, Call bell within reach, Bed / chair alarm activated, and Side rails x 3    COMMUNICATION/EDUCATION:   The patients plan of care was discussed with: Physical therapist, Registered nurse, Case management, and daughter . Home safety education was provided and the patient/caregiver indicated understanding., Patient/family have participated as able in goal setting and plan of care. , and Patient/family agree to work toward stated goals and plan of care. This patients plan of care is appropriate for delegation to [facility].     Thank you for this referral.  Tom Cunningham, OT  Time Calculation: 18 mins

## 2021-09-27 NOTE — PROGRESS NOTES
Hospitalist Progress Note      Hospital summary: The patient is a very pleasant 20-year-old  female who has previous history significant for chronic atrial fibrillation along with dementia and hypothyroidism. She currently resides at [facility]. The patient was sent to the emergency room as she had a fall early this morning. The patient lives with her sister in a single room in [facility]. Her sister fell down this morning. The patient  decided to help her, but  she also fell down. In the emergency room, the patient's imaging was suggestive of a fracture of the right femoral intertrochanteric area. We are asked to admit the patient. The patient's daughter is here who tells me that she had a right hip fracture last year. At that time, she was treated at [facility] as she has a narrow trachea and anesthesiologist told them to go to [facility]. At this time, the patient has no other complaints. 9/26/2021      Assessment/Plan:  Right comminuted femoral intertrochanteric fracture s/p ORIF on 9/26  Secondary to fall  - appreciate Orthopedics input  - pain meds  - DVT ppx with Xarelto  - PT/OT - SNF    Hx  remote MI and Paroxymal atrial fibrillation, currently in sinus rhythm. - Cardiology cleared for surgery. - rate controlled     Hypothyroidism. Continue levothyroxine. Dementia with behavioral issues-  continue current medications buspar, seroquel, zoloft and remeron    Code status: DNR  DVT prophylaxis: Xarelto  Disposition: TBD. Need SNF - likely DC tomorrow  ----------------------------------------------    CC: Hip fracture     S: Patient is seen and examined at bedside this AM. She c/w pain at surgical site - reassures her. Plan for PT/OT today  Spoke with daughter at bedside later in the morning - possible SNF in 1-2 days   Discussed with nursing     Review of Systems:  A comprehensive review of systems was negative.     O:  Visit Vitals  BP 97/60 (BP 1 Location: Right upper arm, BP Patient Position: At rest)   Pulse 89   Temp 98 °F (36.7 °C)   Resp 18   Ht 5' 4\" (1.626 m)   Wt 57.5 kg (126 lb 12.2 oz)   SpO2 92%   BMI 21.76 kg/m²       PHYSICAL EXAM:  Gen: NAD, elderly   HEENT: anicteric sclerae, normal conjunctiva, oropharynx clear, MM moist  Neck: supple, trachea midline, no adenopathy  Heart: RRR, no MRG, no JVD, no peripheral edema  Lungs: CTA b/l, non-labored respirations  Abd: soft, NT, ND, BS+, no organomegaly  Extr: warm.  Right hip bandaged   Skin: dry, no rash  Neuro: alert and oriented x 1-2, normal speech, moves all extremities  Psych: normal mood, appropriate affect      Intake/Output Summary (Last 24 hours) at 9/27/2021 1405  Last data filed at 9/27/2021 0243  Gross per 24 hour   Intake 1020 ml   Output 1200 ml   Net -180 ml        Recent labs & imaging reviewed:  Recent Results (from the past 24 hour(s))   GLUCOSE, POC    Collection Time: 09/26/21 11:09 PM   Result Value Ref Range    Glucose (POC) 177 (H) 65 - 117 mg/dL    Performed by [employer]    METABOLIC PANEL, BASIC    Collection Time: 09/27/21  3:17 AM   Result Value Ref Range    Sodium 135 (L) 136 - 145 mmol/L    Potassium 4.6 3.5 - 5.1 mmol/L    Chloride 108 97 - 108 mmol/L    CO2 23 21 - 32 mmol/L    Anion gap 4 (L) 5 - 15 mmol/L    Glucose 139 (H) 65 - 100 mg/dL    BUN 23 (H) 6 - 20 MG/DL    Creatinine 0.81 0.55 - 1.02 MG/DL    BUN/Creatinine ratio 28 (H) 12 - 20      GFR est AA >60 >60 ml/min/1.73m2    GFR est non-AA >60 >60 ml/min/1.73m2    Calcium 8.4 (L) 8.5 - 10.1 MG/DL   CBC W/O DIFF    Collection Time: 09/27/21  3:18 AM   Result Value Ref Range    WBC 16.0 (H) 3.6 - 11.0 K/uL    RBC 3.01 (L) 3.80 - 5.20 M/uL    HGB 8.6 (L) 11.5 - 16.0 g/dL    HCT 27.6 (L) 35.0 - 47.0 %    MCV 91.7 80.0 - 99.0 FL    MCH 28.6 26.0 - 34.0 PG    MCHC 31.2 30.0 - 36.5 g/dL    RDW 15.5 (H) 11.5 - 14.5 %    PLATELET 996 045 - 806 K/uL    MPV 11.8 8.9 - 12.9 FL    NRBC 0.0 0  WBC    ABSOLUTE NRBC 0.00 0.00 - 0.01 K/uL   GLUCOSE, POC    Collection Time: 09/27/21  7:02 AM   Result Value Ref Range    Glucose (POC) 133 (H) 65 - 117 mg/dL    Performed by [address]     09/27/21  0318 09/26/21  1115   WBC 16.0* 18.4*   HGB 8.6* 11.1*   HCT 27.6* 34.1*    285     Recent Labs     09/27/21  0317 09/26/21  1115   * 134*   K 4.6 4.8    106   CO2 23 24   BUN 23* 23*   CREA 0.81 0.96   * 150*   CA 8.4* 8.8     Recent Labs     09/26/21  1115   ALT 16   AP 76   TBILI 0.5   TP 6.6   ALB 3.3*   GLOB 3.3     Recent Labs     09/26/21  1115   INR 1.4*   PTP 14.7*   APTT 33.3*      No results for input(s): FE, TIBC, PSAT, FERR in the last 72 hours. No results found for: FOL, RBCF   No results for input(s): PH, PCO2, PO2 in the last 72 hours. No results for input(s): CPK, CKNDX, TROIQ in the last 72 hours.     No lab exists for component: CPKMB  No results found for: CHOL, CHOLX, CHLST, CHOLV, HDL, HDLP, LDL, LDLC, DLDLP, TGLX, TRIGL, TRIGP, CHHD, CHHDX  Lab Results   Component Value Date/Time    Glucose (POC) 133 (H) 09/27/2021 07:02 AM    Glucose (POC) 177 (H) 09/26/2021 11:09 PM     Lab Results   Component Value Date/Time    Color YELLOW/STRAW 09/26/2021 11:15 AM    Appearance CLEAR 09/26/2021 11:15 AM    Specific gravity 1.013 09/26/2021 11:15 AM    pH (UA) 7.0 09/26/2021 11:15 AM    Protein Negative 09/26/2021 11:15 AM    Glucose Negative 09/26/2021 11:15 AM    Ketone Negative 09/26/2021 11:15 AM    Bilirubin Negative 09/26/2021 11:15 AM    Urobilinogen 0.2 09/26/2021 11:15 AM    Nitrites Negative 09/26/2021 11:15 AM    Leukocyte Esterase Negative 09/26/2021 11:15 AM       Med list reviewed  Current Facility-Administered Medications   Medication Dose Route Frequency    [START ON 9/28/2021] rivaroxaban (XARELTO) tablet 20 mg  20 mg Oral DAILY    levothyroxine (SYNTHROID) tablet 100 mcg  100 mcg Oral 6am    potassium chloride SR (KLOR-CON 10) tablet 20 mEq  20 mEq Oral DAILY    sertraline (ZOLOFT) tablet 150 mg  150 mg Oral DAILY    mirtazapine (REMERON) tablet 15 mg  15 mg Oral QHS    famotidine (PEPCID) tablet 20 mg  20 mg Oral Q24H    busPIRone (BUSPAR) tablet 15 mg  15 mg Oral BID    QUEtiapine (SEROquel) tablet 50 mg  50 mg Oral BID    oxyCODONE-acetaminophen (PERCOCET) 5-325 mg per tablet 1 Tablet  1 Tablet Oral Q6H PRN    0.9% sodium chloride infusion  125 mL/hr IntraVENous CONTINUOUS    sodium chloride (NS) flush 5-40 mL  5-40 mL IntraVENous Q8H    sodium chloride (NS) flush 5-40 mL  5-40 mL IntraVENous PRN    naloxone (NARCAN) injection 0.4 mg  0.4 mg IntraVENous PRN    calcium-vitamin D (OS-JULIET +D3) 500 mg-200 unit per tablet 1 Tablet  1 Tablet Oral TID WITH MEALS    senna-docusate (PERICOLACE) 8.6-50 mg per tablet 1 Tablet  1 Tablet Oral BID    polyethylene glycol (MIRALAX) packet 17 g  17 g Oral DAILY    [START ON 9/28/2021] bisacodyL (DULCOLAX) suppository 10 mg  10 mg Rectal DAILY PRN    oxyCODONE IR (ROXICODONE) tablet 2.5 mg  2.5 mg Oral Q4H PRN    oxyCODONE IR (ROXICODONE) tablet 5 mg  5 mg Oral Q4H PRN    ceFAZolin (ANCEF) 2 g in sterile water (preservative free) 20 mL IV syringe  2 g IntraVENous Q8H       Care Plan discussed with:  Patient/Family, Nurse and     Brittny Cabrera MD  Internal Medicine  Date of Service: 9/27/2021

## 2021-09-27 NOTE — CONSULTS
[facility] [place] [facility] CARDIOLOGY CONSULT NOTE  Cardiology Initial visit      NAME: Cheryle Dykes   :  1940   MRN:  [id number]  Date/Time:  13:27 PM      ________________________________________________________________________  HPI    80years old lady with the past medical history remarkable for dementia, hypertension, coronary artery disease and paroxysmal atrial fibrillation on Xarelto. She had a PCI 15 of 20 years ago as per her daughter statement. She is admitted secondary to a accidental fall resulting in closed fracture of the right hip. The patient was trying to help her sister with fall and also broken her hip when she fell herself. Her daughter tells me that the patient has not been complaining of any chest pain or shortness of breath and recent or remote past.  The patient herself tells me that she has had no chest pain or shortness of breath. Assessment/Plan  1. PAF       -  Continues in NSR by exam.  Patient not on monitor       - D/w daughter at the bedside. She prefers to resume Xarelto - RESUME WHEN OK         WITH ORTHO  2. Right Hip Fx       - s/p hip nailing       - per Ortho       - resume Xarelto when ok with Ortho  3. Dementia       - baseline       H/o PAF. NSR at this time. D/w Daughter, She prefers to resume Xarelto. Agree. Mechanical fall lead to hip Fx. Needs no further cardiac testing or intervention. Patient can follow up with PCP on discharge. Will see again as needed. Pt with h/o PAF, doing ok in sinus rhythm. Compensated on exam. Agree with resumption of [address]. May need to reconsider in the future if having recurrent falls. No additional cardiac evaluation indicated at this time and will sign off. Ebony Tijerina MD             ICD-10-CM ICD-9-CM    1. Dementia without behavioral disturbance, unspecified dementia type ([facility]  F03.90 294.20    2.  Closed fracture of right hip, initial encounter ([facility]  S72.001A 820.8     Comminuted intertrochanteric          CARDIAC STUDIES    EKG: normal sinus rhythm. IMAGING      CT Results (most recent):  Results from Hospital Encounter encounter on 09/26/21    CT HEAD WO CONT    Narrative  EXAM CLINICAL HISTORY: fall  INDICATION: fall  COMPARISON: None. CT dose reduction was achieved through use of a standardized protocol tailored  for this examination and automatic exposure control for dose modulation. TECHNIQUE: Serial axial images with a collimation of 5 mm were obtained from the  skull base through the vertex  FINDINGS:  There is sulcal and ventricular prominence. Confluent periventricular and  scattered foci of hypodensity in the cerebral white matter. There is no evidence  of an acute infarction, hemorrhage, or mass-effect. There is no evidence of  midline shift or hydrocephalus. Posterior fossa structures are unremarkable. No  extra-axial collections are seen. Mastoid air cells are well pneumatized and clear. There is no evidence of depressed skull fractures of soft tissue swelling. Impression  No acute intracranial process. Imaging findings consistent with mild chronic microvascular ischemic change. There is a moderate to severe degree of cerebral atrophy. XR Results (most recent):  Results from Hospital Encounter encounter on 09/26/21    XR HIP RT DURING OR PROC    Narrative  INTERPRETATION PROVIDED FOR COMPLIANCE ONLY AT NO CHARGE    3 spot fluoroscopic intraoperative views during right proximal femoral ORIF. Anatomic alignment. See operative report for details. MRI Results (most recent):  No results found for this or any previous visit. Subjective:   CHIEF COMPLAINT:  \"preop\"  Pertinent items are noted in HPI. HISTORY OF PRESENT ILLNESS:     Nirmala Eldridge is a 80 y.o. female whom presents with complaint noted above. We were asked to evaluation for the above problems.    Maryann Jennings  Past Medical History:   Diagnosis Date    Arrhythmia     Paroxysmal AFib    CAD (coronary artery disease)     Hypertension     Psychiatric disorder     dementia      History reviewed. No pertinent surgical history. Social History     Tobacco Use    Smoking status: Not on file   Substance Use Topics    Alcohol use: Not on file      History reviewed. No pertinent family history.    No Known Allergies        Current Facility-Administered Medications   Medication Dose Route Frequency    levothyroxine (SYNTHROID) tablet 100 mcg  100 mcg Oral 6am    potassium chloride SR (KLOR-CON 10) tablet 20 mEq  20 mEq Oral DAILY    sertraline (ZOLOFT) tablet 150 mg  150 mg Oral DAILY    mirtazapine (REMERON) tablet 15 mg  15 mg Oral QHS    famotidine (PEPCID) tablet 20 mg  20 mg Oral Q24H    busPIRone (BUSPAR) tablet 15 mg  15 mg Oral BID    QUEtiapine (SEROquel) tablet 50 mg  50 mg Oral BID    oxyCODONE-acetaminophen (PERCOCET) 5-325 mg per tablet 1 Tablet  1 Tablet Oral Q6H PRN    0.9% sodium chloride infusion  125 mL/hr IntraVENous CONTINUOUS    sodium chloride (NS) flush 5-40 mL  5-40 mL IntraVENous Q8H    sodium chloride (NS) flush 5-40 mL  5-40 mL IntraVENous PRN    naloxone (NARCAN) injection 0.4 mg  0.4 mg IntraVENous PRN    calcium-vitamin D (OS-JULIET +D3) 500 mg-200 unit per tablet 1 Tablet  1 Tablet Oral TID WITH MEALS    senna-docusate (PERICOLACE) 8.6-50 mg per tablet 1 Tablet  1 Tablet Oral BID    polyethylene glycol (MIRALAX) packet 17 g  17 g Oral DAILY    [START ON 9/28/2021] bisacodyL (DULCOLAX) suppository 10 mg  10 mg Rectal DAILY PRN    oxyCODONE IR (ROXICODONE) tablet 2.5 mg  2.5 mg Oral Q4H PRN    oxyCODONE IR (ROXICODONE) tablet 5 mg  5 mg Oral Q4H PRN    ceFAZolin (ANCEF) 2 g in sterile water (preservative free) 20 mL IV syringe  2 g IntraVENous Q8H            Prior to Admission medications    Not on File                Objective:   VITALS:      Patient Vitals for the past 12 hrs:   Temp Pulse Resp BP SpO2   09/27/21 0832 98 °F (36.7 °C) 89 18 97/60 92 %   09/27/21 0243 98.8 °F (37.1 °C) 82 18 104/66 97 %   09/26/21 2258 97.5 °F (36.4 °C) 86 16 113/73 95 %        Visit Vitals  BP 97/60 (BP 1 Location: Right upper arm, BP Patient Position: At rest)   Pulse 89   Temp 98 °F (36.7 °C)   Resp 18   Ht 5' 4\" (1.626 m)   Wt 126 lb 12.2 oz (57.5 kg)   SpO2 92%   BMI 21.76 kg/m²         Extended / Orthostatic Vitals: Wt Readings from Last 3 Encounters:   09/26/21 126 lb 12.2 oz (57.5 kg)         Intake/Output Summary (Last 24 hours) at 9/27/2021 0942  Last data filed at 9/27/2021 0243  Gross per 24 hour   Intake 1020 ml   Output 1200 ml   Net -180 ml          Neck: no JVD  Heart: regular rate and rhythm, systolic murmur: systolic ejection 2/6, crescendo at 2nd left intercostal space, at 2nd right intercostal space  Lungs: clear to auscultation bilaterally  Abdomen: soft, non-tender. Bowel sounds normal. No masses,  no organomegaly  Extremities: no edema          LAB DATA REVIEWED:      All Cardiac Markers in the last 24 hours:  No results found for: CPK, CK, CKMMB, CKMB, RCK3, CKMBT, CKMBPOC, CKNDX, CKND1, BRADLEY, TROPT, TROIQ, ELIA, TROPT, TNIPOC, BNP, BNPP, BNPNT      Recent Results (from the past 24 hour(s))   SAMPLES BEING HELD    Collection Time: 09/26/21 11:15 AM   Result Value Ref Range    SAMPLES BEING HELD 1 RED     COMMENT        Add-on orders for these samples will be processed based on acceptable specimen integrity and analyte stability, which may vary by analyte.    CBC WITH AUTOMATED DIFF    Collection Time: 09/26/21 11:15 AM   Result Value Ref Range    WBC 18.4 (H) 3.6 - 11.0 K/uL    RBC 3.81 3.80 - 5.20 M/uL    HGB 11.1 (L) 11.5 - 16.0 g/dL    HCT 34.1 (L) 35.0 - 47.0 %    MCV 89.5 80.0 - 99.0 FL    MCH 29.1 26.0 - 34.0 PG    MCHC 32.6 30.0 - 36.5 g/dL    RDW 15.6 (H) 11.5 - 14.5 %    PLATELET 907 792 - 588 K/uL    MPV 12.2 8.9 - 12.9 FL    NRBC 0.0 0  WBC    ABSOLUTE NRBC 0.00 0.00 - 0.01 K/uL    NEUTROPHILS 89 (H) 32 - 75 %    LYMPHOCYTES 3 (L) 12 - 49 %    MONOCYTES 7 5 - 13 %    EOSINOPHILS 0 0 - 7 %    BASOPHILS 0 0 - 1 %    IMMATURE GRANULOCYTES 1 (H) 0.0 - 0.5 %    ABS. NEUTROPHILS 16.3 (H) 1.8 - 8.0 K/UL    ABS. LYMPHOCYTES 0.6 (L) 0.8 - 3.5 K/UL    ABS. MONOCYTES 1.3 (H) 0.0 - 1.0 K/UL    ABS. EOSINOPHILS 0.0 0.0 - 0.4 K/UL    ABS. BASOPHILS 0.0 0.0 - 0.1 K/UL    ABS. IMM. GRANS. 0.2 (H) 0.00 - 0.04 K/UL    DF SMEAR SCANNED      RBC COMMENTS NORMOCYTIC, NORMOCHROMIC     METABOLIC PANEL, COMPREHENSIVE    Collection Time: 09/26/21 11:15 AM   Result Value Ref Range    Sodium 134 (L) 136 - 145 mmol/L    Potassium 4.8 3.5 - 5.1 mmol/L    Chloride 106 97 - 108 mmol/L    CO2 24 21 - 32 mmol/L    Anion gap 4 (L) 5 - 15 mmol/L    Glucose 150 (H) 65 - 100 mg/dL    BUN 23 (H) 6 - 20 MG/DL    Creatinine 0.96 0.55 - 1.02 MG/DL    BUN/Creatinine ratio 24 (H) 12 - 20      GFR est AA >60 >60 ml/min/1.73m2    GFR est non-AA 56 (L) >60 ml/min/1.73m2    Calcium 8.8 8.5 - 10.1 MG/DL    Bilirubin, total 0.5 0.2 - 1.0 MG/DL    ALT (SGPT) 16 12 - 78 U/L    AST (SGOT) 34 15 - 37 U/L    Alk.  phosphatase 76 45 - 117 U/L    Protein, total 6.6 6.4 - 8.2 g/dL    Albumin 3.3 (L) 3.5 - 5.0 g/dL    Globulin 3.3 2.0 - 4.0 g/dL    A-G Ratio 1.0 (L) 1.1 - 2.2     URINALYSIS W/ RFLX MICROSCOPIC    Collection Time: 09/26/21 11:15 AM   Result Value Ref Range    Color YELLOW/STRAW      Appearance CLEAR CLEAR      Specific gravity 1.013 1.003 - 1.030      pH (UA) 7.0 5.0 - 8.0      Protein Negative NEG mg/dL    Glucose Negative NEG mg/dL    Ketone Negative NEG mg/dL    Bilirubin Negative NEG      Blood Negative NEG      Urobilinogen 0.2 0.2 - 1.0 EU/dL    Nitrites Negative NEG      Leukocyte Esterase Negative NEG     PTT    Collection Time: 09/26/21 11:15 AM   Result Value Ref Range    aPTT 33.3 (H) 22.1 - 31.0 sec    aPTT, therapeutic range     58.0 - 77.0 SECS   PROTHROMBIN TIME + INR    Collection Time: 09/26/21 11:15 AM   Result Value Ref Range    INR 1.4 (H) 0.9 - 1.1      Prothrombin time 14.7 (H) 9.0 - 11.1 sec   SAMPLES BEING HELD    Collection Time: 09/26/21 11:15 AM   Result Value Ref Range    SAMPLES BEING HELD 1CUP OF URINE     COMMENT        Add-on orders for these samples will be processed based on acceptable specimen integrity and analyte stability, which may vary by analyte.    EKG, 12 LEAD, INITIAL    Collection Time: 09/26/21 11:55 AM   Result Value Ref Range    Ventricular Rate 68 BPM    Atrial Rate 68 BPM    P-R Interval 234 ms    QRS Duration 92 ms    Q-T Interval 440 ms    QTC Calculation (Bezet) 467 ms    Calculated P Axis 78 degrees    Calculated R Axis 56 degrees    Calculated T Axis 51 degrees    Diagnosis       Sinus rhythm with 1st degree AV block  Low voltage QRS  No previous ECGs available  Confirmed by Shobha Marques ([id number]) on 9/26/2021 2:28:32 PM     TYPE & SCREEN    Collection Time: 09/26/21 12:28 PM   Result Value Ref Range    Crossmatch Expiration 09/29/2021,2359     ABO/Rh(D) Vance Contreras NEGATIVE     Antibody screen NEG    COVID-19 RAPID TEST    Collection Time: 09/26/21 12:50 PM   Result Value Ref Range    Specimen source Nasopharyngeal      COVID-19 rapid test Not detected NOTD     GLUCOSE, POC    Collection Time: 09/26/21 11:09 PM   Result Value Ref Range    Glucose (POC) 177 (H) 65 - 117 mg/dL    Performed by IRISH Corrales    METABOLIC PANEL, BASIC    Collection Time: 09/27/21  3:17 AM   Result Value Ref Range    Sodium 135 (L) 136 - 145 mmol/L    Potassium 4.6 3.5 - 5.1 mmol/L    Chloride 108 97 - 108 mmol/L    CO2 23 21 - 32 mmol/L    Anion gap 4 (L) 5 - 15 mmol/L    Glucose 139 (H) 65 - 100 mg/dL    BUN 23 (H) 6 - 20 MG/DL    Creatinine 0.81 0.55 - 1.02 MG/DL    BUN/Creatinine ratio 28 (H) 12 - 20      GFR est AA >60 >60 ml/min/1.73m2    GFR est non-AA >60 >60 ml/min/1.73m2    Calcium 8.4 (L) 8.5 - 10.1 MG/DL   CBC W/O DIFF    Collection Time: 09/27/21  3:18 AM   Result Value Ref Range    WBC 16.0 (H) 3.6 - 11.0 K/uL    RBC 3.01 (L) 3.80 - 5.20 M/uL    HGB 8.6 (L) 11.5 - 16.0 g/dL    HCT 27.6 (L) 35.0 - 47.0 %    MCV 91.7 80.0 - 99.0 FL    MCH 28.6 26.0 - 34.0 PG    MCHC 31.2 30.0 - 36.5 g/dL    RDW 15.5 (H) 11.5 - 14.5 %    PLATELET 761 642 - 337 K/uL    MPV 11.8 8.9 - 12.9 FL    NRBC 0.0 0  WBC    ABSOLUTE NRBC 0.00 0.00 - 0.01 K/uL   GLUCOSE, POC    Collection Time: 09/27/21  7:02 AM   Result Value Ref Range    Glucose (POC) 133 (H) 65 - 117 mg/dL    Performed by IRISH Corrales        No results found for: CHOL, CHOLPOCT, CHOLX, CHLST, CHOLV, HDL, HDLPOC, HDLP, LDL, LDLCPOC, LDLC, DLDLP, VLDLC, VLDL, TGLX, TRIGL, TRIGP, TGLPOCT, CHHD, CHHDX          Procedures: see electronic medical records for all procedures/Xrays and details which were not copied into this note but were reviewed prior to creation of Plan.               Care Plan discussed with:  Patient    Family x   RN    Care Manager    Consultant/Specialist:     ________________________    ______________________________________________      Signed By: MEKHI Dodson MD     September 27, 2021

## 2021-09-27 NOTE — OP NOTES
[address]   OPERATIVE REPORT    Name:  Mariam Turcios  MR#:  [id number]  :  1940  ACCOUNT #:  [de-identified]  DATE OF SERVICE:  2021      PREOPERATIVE DIAGNOSIS:  Displaced right intertrochanteric femur fracture. POSTOPERATIVE DIAGNOSIS:  Displaced right intertrochanteric femur fracture. PROCEDURE PERFORMED:  Intramedullary nailing, right intertrochanteric femur fracture. SURGEON:  Maru Zuñiga MD    ASSISTANT:  NATALIE Salazar    ANESTHESIA:  General endotracheal intubation. COMPLICATIONS:  None. SPECIMENS REMOVED:  None. IMPLANTS:  Alli Gamma3 nail 170 mm x 10 mm, 95-mm lag screw, one set screw, one distal locking screw. ESTIMATED BLOOD LOSS:  100 mL. INDICATIONS FOR SURGERY:  The patient is an 80-year-old female who had a ground-level fall at her adult home facility this morning, suffered a displaced right intertrochanteric femur fracture. She was evaluated by the Medicine Service and deemed at an acceptable risk for that with this procedure. I explained the risks and benefits of the procedure to the patient and her family member. I gave them no guarantees of the outcome of this procedure. I gave the family member and the patient the opportunity to ask questions. The patient and family member were agreeable to this procedure. DESCRIPTION OF PROCEDURE:  After informed consent was obtained, the patient was brought to the operating room, placed in the supine position. After adequate anesthesia was administered and the patient was intubated, she was transferred to the [facility] table. A well-padded perineal bump was used. The right arm was draped across the chest, well padded, and secured at the elbow. Both feet were placed in well-padded traction boots. I performed a reduction maneuver on the right hip and applied in-line traction with slight internal rotation. The left leg was scissored below the right leg.   Prior to prepping and draping, I took fluoroscopic views of the right hip and the reduction of the femur was acceptable. At this point, the right hip and thigh were draped and prepped in the usual sterile fashion. Proper time-out was performed. I identified the tip of the greater trochanter under fluoroscopic guidance. Fluoroscopy was used throughout this procedure to check fracture reduction and hardware placement. I made a skin incision about 1 inch proximal to the tip of the greater trochanter in-line with the femur. The incision was about 1 inch. Subcutaneous layer was incised in line with the skin. I incised the IT band to gain access to the tip of the greater trochanter. I placed the tip of the sharp awl to the tip of the greater trochanter and drove this awl into the intramedullary canal under fluoroscopic guidance. When I felt it was in the proper position, I placed a ball-tip guidewire in the awl into the intramedullary canal down to the distal femur. I checked its position in AP and lateral views with fluoroscopy. This was acceptable. I removed the awl and then used a one-step reamer over the guidewire to ream the proximal canal and opened this up. I then placed the selected intramedullary nail over the guidewire and down to the intramedullary canal.  This nail had the external guide attached to it. When the nail was in the proper position, I removed the guidewire. I then used the external guide to place the lag screw. I first placed the cannula through the proper hole and the external guide for 125-degree angled nail. I made a skin indentation, made a skin incision at this site and carried this incision through the soft tissue to the lateral cortex. I placed the cannula to the lateral cortex. I then drilled a guidewire in center-center position of the head and neck. When I felt this was in the proper position, I measured the length of the lag screw to be placed.   I set the reamer to that depth and over-reamed the guidewire to that depth. I then placed the selected lag screw over the guidewire into the femoral head. At this point, fluoroscopy demonstrated acceptable fracture reduction and hardware placement. I released traction and applied compression over the fracture site. I then locked this from above with the set screw, turned this back about a half turn to allow slight slippage of the lag screw and the nail. At this point, fluoroscopy demonstrated acceptable fracture reduction and hardware placement. I then used external guide again to place the distal locking screw. I used the proper cannula and placed it through the static hole in the external guide. I made a skin incision at this site and carried it through the soft tissue. I placed the cannula to the lateral cortex and drilled from lateral to medial.  I then measured the length of the screw to be placed and placed a fully-threaded cortical screw and locking nail distally. At this point, fluoroscopy demonstrated acceptable fracture reduction and hardware placement. All wounds were thoroughly irrigated with normal saline using bulb syringe. The wounds were closed in layered fashion as needed with a 2-0 Vicryl suture for the subcutaneous tissue. The skin for all wounds was approximated with staples. Dry sterile dressing was applied to all wounds. The patient was then easily awakened, extubated, and transported to the postanesthesia  care unit in stable condition. There were no complications during this case. Surgical assistant was critical during this case to assist with the patient's leg positioning, fracture reduction, placement of the hardware, use of the instrumentation, and wound closure.         MD SUHA [email or web address]  D:  09/26/2021 18:16  T:  09/26/2021 23:21  JOB #:  [id number]  CC:  Colin Arroyo MD       [address] [place] [address]

## 2021-09-27 NOTE — PROGRESS NOTES
Problem: Mobility Impaired (Adult and Pediatric)  Goal: *Acute Goals and Plan of Care (Insert Text)  Description: FUNCTIONAL STATUS PRIOR TO ADMISSION: Pt resides at [address] [place] [address] within memory care unit, with ADL assist provided by staffing. HOME SUPPORT: The patient lived with LTC staffing to provide ADL/IADL assist.  Supportive daughter    Physical Therapy Goals  Initiated 9/27/2021  1. Patient will move from supine to sit and sit to supine , scoot up and down, and roll side to side in bed with moderate assistance of 2  within 7 day(s). 2.  Patient will transfer from bed to chair and chair to bed with moderate assistance of 2  using the least restrictive device within 7 day(s). 3.  Patient will perform sit to stand with moderate assistance of 2  within 7 day(s). 4.  Patient will ambulate with moderate assistance of 2 for 10 feet with the least restrictive device within 7 day(s). Outcome: Progressing Towards Goal   PHYSICAL THERAPY EVALUATION  Patient: Didi Garcia (63 y.o. female)  Date: 9/27/2021  Primary Diagnosis: Hip fracture ([facility]) [S72.009A]  Procedure(s) (LRB):  [place] gamma nail. Soledad tableC arm (Right) 1 Day Post-Op   Precautions:   Fall, WBAT    ASSESSMENT  Based on the objective data described below, the patient presents with  impairment in functional mobility, activity tolerance and balance s/p fall resulting in R hip fracture and gamma nail. Patient resides in [facility] and required assistance from staff for ADLs. Patient lethargic this afternoon. Current Level of Function Impacting Discharge (mobility/balance): Patient required maximal-total assistance of 2 for all mobility. Unable to attain sitting balance . Functional Outcome Measure: The patient scored 0/100 on the Barthel outcome measure which is indicative of total impaired ability to care for basic self-needs/dependency on other.       Other factors to consider for discharge: Dementia     Patient will benefit from skilled therapy intervention to address the above noted impairments. PLAN :  Recommendations and Planned Interventions: bed mobility training, transfer training, gait training, therapeutic exercises, patient and family training/education, and therapeutic activities      Frequency/Duration: Patient will be followed by physical therapy:  daily to address goals. Recommendation for discharge: (in order for the patient to meet his/her long term goals)  Therapy up to 5 days/week in SNF setting    This discharge recommendation:  Has been made in collaboration with the attending provider and/or case management    IF patient discharges home will need the following DME: to be determined (TBD)         SUBJECTIVE:   Patient stated I am tired.     OBJECTIVE DATA SUMMARY:   HISTORY:    Past Medical History:   Diagnosis Date    Arrhythmia     Paroxysmal AFib    CAD (coronary artery disease)     Hypertension     Psychiatric disorder     dementia   History reviewed. No pertinent surgical history. Personal factors and/or comorbidities impacting plan of care: Dementia, came from [facility]: Long term care ([place])  Living Alone: No  Support Systems: Child(juan), Paulven  Patient Expects to be Discharged to[de-identified] Skilled nursing facility    EXAMINATION/PRESENTATION/DECISION MAKING:   Critical Behavior:  Neurologic State: Alert, Confused  Orientation Level: Oriented to person, Disoriented to place, Disoriented to situation, Disoriented to time  Cognition: Decreased attention/concentration, Decreased command following, Memory loss, Poor safety awareness  Safety/Judgement: Decreased awareness of environment, Decreased awareness of need for assistance, Decreased awareness of need for safety, Decreased insight into deficits  Hearing:   Auditory  Auditory Impairment: None    Range Of Motion:  AROM: Generally decreased, functional           PROM: Generally decreased, functional           Strength:    Strength: Generally decreased, functional                    Tone & Sensation:   Tone: Normal              Sensation: Intact               Coordination:  Coordination: Generally decreased, functional  Vision:   Corrective Lenses: Glasses  Functional Mobility:  Bed Mobility:  Rolling: Maximum assistance;Assist x2  Supine to Sit: Total assistance;Assist x2  Sit to Supine: Total assistance;Assist x2  Scooting: Total assistance;Assist x2  Transfers:                             Balance:   Sitting: Impaired; With support  Sitting - Static: Poor (constant support)  Sitting - Dynamic:  (Unable)  Standing:  (Unable)  Ambulation/Gait Training:                  Functional Measure:  Barthel Index:    Bathin  Bladder: 0  Bowels: 0  Groomin  Dressin  Feedin  Mobility: 0  Stairs: 0  Toilet Use: 0  Transfer (Bed to Chair and Back): 0  Total: 0/100       The Barthel ADL Index: Guidelines  1. The index should be used as a record of what a patient does, not as a record of what a patient could do. 2. The main aim is to establish degree of independence from any help, physical or verbal, however minor and for whatever reason. 3. The need for supervision renders the patient not independent. 4. A patient's performance should be established using the best available evidence. Asking the patient, friends/relatives and nurses are the usual sources, but direct observation and common sense are also important. However direct testing is not needed. 5. Usually the patient's performance over the preceding 24-48 hours is important, but occasionally longer periods will be relevant. 6. Middle categories imply that the patient supplies over 50 per cent of the effort. 7. Use of aids to be independent is allowed. [place] Nguyễn., Barthel, D.W. (0063). Functional evaluation: the Barthel Index. [facility] (14)2. ASHA Bravo, Dylan Brown., Andrzej Asif., [facility], [facility] ().  Measuring the change indisability after inpatient rehabilitation; comparison of the responsiveness of the Barthel Index and Functional [place] Measure. Journal of Neurology, Neurosurgery, and Psychiatry, 66(4), 164-626. CARLOTTA Dowell, DAINA Mariscal, & Marianna Lee M.A. (2004.) Assessment of post-stroke quality of life in cost-effectiveness studies: The usefulness of the Barthel Index and the EuroQoL-5D. Quality of Life Research, 15, 004-95           Physical Therapy Evaluation Charge Determination   History Examination Presentation Decision-Making   HIGH Complexity :3+ comorbidities / personal factors will impact the outcome/ POC  HIGH Complexity : 4+ Standardized tests and measures addressing body structure, function, activity limitation and / or participation in recreation  HIGH Complexity : Unstable and unpredictable characteristics  HIGH Complexity : FOTO score of 1- 25       Based on the above components, the patient evaluation is determined to be of the following complexity level: HIGH     Pain Rating:  Unable to rate, but cries out when RLE moved. Activity Tolerance:   Fair    After treatment patient left in no apparent distress:   Supine in bed, Call bell within reach, Bed / chair alarm activated, Side rails x 3, and nurse notified. COMMUNICATION/EDUCATION:   The patients plan of care was discussed with: Occupational therapist, Registered nurse, and Case management. Patient is unable to participate in goal setting and plan of care.     Thank you for this referral.  Andrea Posdaa   Time Calculation: 25 mins

## 2021-09-28 LAB
COVID-19 RAPID TEST, COVR: NOT DETECTED
ERYTHROCYTE [DISTWIDTH] IN BLOOD BY AUTOMATED COUNT: 16.1 % (ref 11.5–14.5)
HCT VFR BLD AUTO: 20.9 % (ref 35–47)
HGB BLD-MCNC: 6.5 G/DL (ref 11.5–16)
HISTORY CHECKED?,CKHIST: NORMAL
MCH RBC QN AUTO: 29.1 PG (ref 26–34)
MCHC RBC AUTO-ENTMCNC: 31.1 G/DL (ref 30–36.5)
MCV RBC AUTO: 93.7 FL (ref 80–99)
NRBC # BLD: 0 K/UL (ref 0–0.01)
NRBC BLD-RTO: 0 PER 100 WBC
PLATELET # BLD AUTO: 140 K/UL (ref 150–400)
PMV BLD AUTO: 12.2 FL (ref 8.9–12.9)
RBC # BLD AUTO: 2.23 M/UL (ref 3.8–5.2)
SOURCE, COVRS: NORMAL
WBC # BLD AUTO: 11.3 K/UL (ref 3.6–11)

## 2021-09-28 PROCEDURE — 51798 US URINE CAPACITY MEASURE: CPT

## 2021-09-28 PROCEDURE — 74011250637 HC RX REV CODE- 250/637: Performed by: STUDENT IN AN ORGANIZED HEALTH CARE EDUCATION/TRAINING PROGRAM

## 2021-09-28 PROCEDURE — 74011250637 HC RX REV CODE- 250/637: Performed by: HOSPITALIST

## 2021-09-28 PROCEDURE — 30233N1 TRANSFUSION OF NONAUTOLOGOUS RED BLOOD CELLS INTO PERIPHERAL VEIN, PERCUTANEOUS APPROACH: ICD-10-PCS | Performed by: INTERNAL MEDICINE

## 2021-09-28 PROCEDURE — 74011250637 HC RX REV CODE- 250/637: Performed by: INTERNAL MEDICINE

## 2021-09-28 PROCEDURE — 85027 COMPLETE CBC AUTOMATED: CPT

## 2021-09-28 PROCEDURE — 74011250636 HC RX REV CODE- 250/636: Performed by: INTERNAL MEDICINE

## 2021-09-28 PROCEDURE — 2709999900 HC NON-CHARGEABLE SUPPLY

## 2021-09-28 PROCEDURE — 36415 COLL VENOUS BLD VENIPUNCTURE: CPT

## 2021-09-28 PROCEDURE — 65270000029 HC RM PRIVATE

## 2021-09-28 PROCEDURE — P9016 RBC LEUKOCYTES REDUCED: HCPCS

## 2021-09-28 PROCEDURE — 36430 TRANSFUSION BLD/BLD COMPNT: CPT

## 2021-09-28 PROCEDURE — 77030019905 HC CATH URETH INTMIT MDII -A

## 2021-09-28 PROCEDURE — 87635 SARS-COV-2 COVID-19 AMP PRB: CPT

## 2021-09-28 RX ORDER — ACETAMINOPHEN 325 MG/1
650 TABLET ORAL EVERY 6 HOURS
Status: DISCONTINUED | OUTPATIENT
Start: 2021-09-28 | End: 2021-09-29 | Stop reason: HOSPADM

## 2021-09-28 RX ORDER — SODIUM CHLORIDE 9 MG/ML
75 INJECTION, SOLUTION INTRAVENOUS CONTINUOUS
Status: DISPENSED | OUTPATIENT
Start: 2021-09-28 | End: 2021-09-29

## 2021-09-28 RX ORDER — SODIUM CHLORIDE 9 MG/ML
250 INJECTION, SOLUTION INTRAVENOUS AS NEEDED
Status: DISCONTINUED | OUTPATIENT
Start: 2021-09-28 | End: 2021-09-29 | Stop reason: HOSPADM

## 2021-09-28 RX ADMIN — SODIUM CHLORIDE 75 ML/HR: 900 INJECTION, SOLUTION INTRAVENOUS at 17:23

## 2021-09-28 RX ADMIN — Medication 10 ML: at 20:13

## 2021-09-28 RX ADMIN — ACETAMINOPHEN 650 MG: 325 TABLET ORAL at 20:12

## 2021-09-28 RX ADMIN — POLYETHYLENE GLYCOL 3350 17 G: 17 POWDER, FOR SOLUTION ORAL at 08:40

## 2021-09-28 RX ADMIN — CALCIUM CARBONATE-VITAMIN D TAB 500 MG-200 UNIT 1 TABLET: 500-200 TAB at 07:18

## 2021-09-28 RX ADMIN — QUETIAPINE FUMARATE 50 MG: 25 TABLET ORAL at 08:40

## 2021-09-28 RX ADMIN — MIRTAZAPINE 15 MG: 15 TABLET, FILM COATED ORAL at 20:12

## 2021-09-28 RX ADMIN — POTASSIUM CHLORIDE 20 MEQ: 750 TABLET, FILM COATED, EXTENDED RELEASE ORAL at 08:40

## 2021-09-28 RX ADMIN — DOCUSATE SODIUM 50 MG AND SENNOSIDES 8.6 MG 1 TABLET: 8.6; 5 TABLET, FILM COATED ORAL at 08:39

## 2021-09-28 RX ADMIN — SERTRALINE 150 MG: 50 TABLET, FILM COATED ORAL at 08:40

## 2021-09-28 RX ADMIN — LEVOTHYROXINE SODIUM 100 MCG: 0.1 TABLET ORAL at 07:18

## 2021-09-28 RX ADMIN — QUETIAPINE FUMARATE 50 MG: 25 TABLET ORAL at 17:57

## 2021-09-28 RX ADMIN — ACETAMINOPHEN 650 MG: 325 TABLET ORAL at 12:40

## 2021-09-28 RX ADMIN — CALCIUM CARBONATE-VITAMIN D TAB 500 MG-200 UNIT 1 TABLET: 500-200 TAB at 12:40

## 2021-09-28 RX ADMIN — FAMOTIDINE 20 MG: 20 TABLET ORAL at 17:58

## 2021-09-28 RX ADMIN — RIVAROXABAN 20 MG: 20 TABLET, FILM COATED ORAL at 12:40

## 2021-09-28 RX ADMIN — OXYCODONE 2.5 MG: 5 TABLET ORAL at 16:05

## 2021-09-28 RX ADMIN — BUSPIRONE HYDROCHLORIDE 15 MG: 10 TABLET ORAL at 17:57

## 2021-09-28 RX ADMIN — CALCIUM CARBONATE-VITAMIN D TAB 500 MG-200 UNIT 1 TABLET: 500-200 TAB at 17:15

## 2021-09-28 RX ADMIN — OXYCODONE 2.5 MG: 5 TABLET ORAL at 08:40

## 2021-09-28 RX ADMIN — DOCUSATE SODIUM 50 MG AND SENNOSIDES 8.6 MG 1 TABLET: 8.6; 5 TABLET, FILM COATED ORAL at 17:57

## 2021-09-28 RX ADMIN — BUSPIRONE HYDROCHLORIDE 15 MG: 10 TABLET ORAL at 12:40

## 2021-09-28 NOTE — PROGRESS NOTES
Bedside shift change report given to [address]  (oncoming nurse) by Stephen Amanda  (offgoing nurse). Report included the following information SBAR, Kardex, Intake/Output and MAR.

## 2021-09-28 NOTE — PROGRESS NOTES
Occupational Therapy    Chart reviewed in prep for skilled OT treatment; however, pt's HgB 6.5; therefore, OT to hold treatment and follow up later as able and appropriate.     Thank you,  Maurizio Alcaraz, OT

## 2021-09-28 NOTE — PROGRESS NOTES
Ortho Daily Progress Note    9/28/2021    POD:  2 Days Post-Op  S/P:  Procedure(s):  Right intramedullary nail placement    HPI: 79 yo F that is POD2, s/p Right intertrochanteric femur fracture that is now s/p intramedullary nail placement with Dr. Reese Livingston on 9/26/21. The patient is intermittently confused upon encounter. The patient's daughter is at bedside. The patient denies having current pain. The patient's daughter states her mom's hip has been doing well since surgery. No other orthopedic complaints. LABS:  Lab Results   Component Value Date/Time    HGB 6.5 (L) 09/28/2021 03:43 AM    INR 1.4 (H) 09/26/2021 11:15 AM     Recent Results (from the past 12 hour(s))   CBC W/O DIFF    Collection Time: 09/28/21  3:43 AM   Result Value Ref Range    WBC 11.3 (H) 3.6 - 11.0 K/uL    RBC 2.23 (L) 3.80 - 5.20 M/uL    HGB 6.5 (L) 11.5 - 16.0 g/dL    HCT 20.9 (L) 35.0 - 47.0 %    MCV 93.7 80.0 - 99.0 FL    MCH 29.1 26.0 - 34.0 PG    MCHC 31.1 30.0 - 36.5 g/dL    RDW 16.1 (H) 11.5 - 14.5 %    PLATELET 579 (L) 712 - 400 K/uL    MPV 12.2 8.9 - 12.9 FL    NRBC 0.0 0  WBC    ABSOLUTE NRBC 0.00 0.00 - 0.01 K/uL   RBC, ALLOCATE    Collection Time: 09/28/21  9:30 AM   Result Value Ref Range    HISTORY CHECKED? Historical check performed          ORTHOPEDIC PHYSICAL EXAM:  RLE MSK: Right hip with dressing to the lateral hip. Dressing is C/D/I. Compartments of the RLE are soft and compressible, without pain. 1+ DP pulse. Cap refill is brisk. Sensation to light touch intact to the medial/lateral/posterior foot. 4/5 strength with dorsi/plantar flexion. Able to flex and extend toes. Without pain on passive flexion of great toe. Negative Hommans sign. ORTHOPEDIC ASSESSMENT:   Right intertrochanteric femur fracture that is now s/p intramedullary nail placement    ORTHOPEDIC PLAN:  1. Followed by Dr. Kaylah Elizabeth  2. Ortho plan: No further ortho surgical needs.  H/H dropped to 6.5/27.9 this am. Repeat Hgb sent and awaiting results. 3. DV ppx: Xarelto; SCDs  4. Pain control: Adequate; per admitting team, ice and elevation. Ace wrap prn.   5. Dressing: May leave dressing and ace wrap in place if remains C/D/I. Change prn for saturation with dry sterile dressing. 6. Activity: WBAT RLE   8. Dispo: Awaiting SNF placement. Awaiting H/H repeat results.      [facility], [address]

## 2021-09-28 NOTE — PROGRESS NOTES
Pt slept throughout the night without c/o of pain or discomfort,  called at around 6:30am with critical lab result hemo went down to 5.8 but was 8.5 earlier in the day so lab was retested and staff is waiting on results, pt was straight cath around 8:30am with 300ml of output, call bell within reach and bed lowered and locked, will continue to monitor for any changes.

## 2021-09-28 NOTE — PROGRESS NOTES
RUR: 18% Low     ROCHELLE: Discharge to [facility], Wed. 9/29. Nursing to call report to [phone]. CM placed transportation on will call with [facility] for tomorrow, Wed. 9/29 ([facility], phone [phone]). Follow up with ortho.      Primary Contact: Daughter, Boogie Calles, [phone]    1:15pm - CM informed patient's daughter of accepting SNF's. Daughter prefers patient to discharge to [facility]. CM spoke with Kishore, Admissions Coordinator at [facility] and are able to accept patient. Rapid COVID needed prior to admission. CM notified nursing to order rapid test. Nursing to call report to [phone].     2:00pm - CM faxed negative rapid COVID test results to [place]. CM will need to fax discharge instructions to [facility] (fax: [phone]). CM placed transportation on will call with [facility] for tomorrow, Wed. 9/29 ([facility], phone [phone]). CM to inform daughter of [facility] transport time once scheduled.      Richie Paz, MSW   [phone]

## 2021-09-28 NOTE — PROGRESS NOTES
Physical Therapy    Chart reviewed in prep for skilled PT treatment; however, pt's HgB 6.5; therefore, PT to hold treatment per protocol and follow up later as able and appropriate.     Lily Cueto

## 2021-09-28 NOTE — PROGRESS NOTES
Hospitalist Progress Note      Hospital summary: The patient is a very pleasant 25-year-old  female who has previous history significant for chronic atrial fibrillation along with dementia and hypothyroidism. She currently resides at [facility]. The patient was sent to the emergency room as she had a fall early this morning. The patient lives with her sister in a single room in [facility]. Her sister fell down this morning. The patient  decided to help her, but  she also fell down. In the emergency room, the patient's imaging was suggestive of a fracture of the right femoral intertrochanteric area. We are asked to admit the patient. The patient's daughter is here who tells me that she had a right hip fracture last year. At that time, she was treated at [facility] as she has a narrow trachea and anesthesiologist told them to go to [facility]. At this time, the patient has no other complaints. 9/26/2021      Assessment/Plan:  Right comminuted femoral intertrochanteric fracture s/p ORIF on 9/26  Secondary to fall  - appreciate Orthopedics input  - pain meds  - DVT ppx with Xarelto  - PT/OT - SNF    Anemia - post op blood loss  - hb 6.5 today. 1U PRBC ordered   - monitor hb    Hx  remote MI and Paroxymal atrial fibrillation, currently in sinus rhythm. - Cardiology cleared for surgery. - rate controlled     Hypothyroidism. Continue levothyroxine. Dementia with behavioral issues-  continue current medications buspar, seroquel, zoloft and remeron    Code status: DNR  DVT prophylaxis: Xarelto  Disposition: TBD. Need SNF [facility]  - likely DC tomorrow  ----------------------------------------------    CC: Hip fracture     S: Patient is seen and examined at bedside this AM. She feels ok.  Denied any pain  Spoke with daughter at bedside later in the morning - low hb, plan for BT, possible DC to SNF tomorrow   Discussed with nursing     Review of Systems:  QUAN comprehensive review of systems was negative. O:  Visit Vitals  /65 (BP 1 Location: Left upper arm, BP Patient Position: At rest)   Pulse 92   Temp 97.3 °F (36.3 °C)   Resp 17   Ht 5' 4\" (1.626 m)   Wt 56.7 kg (125 lb)   SpO2 92%   BMI 21.46 kg/m²       PHYSICAL EXAM:  Gen: NAD, elderly   HEENT: anicteric sclerae, normal conjunctiva, oropharynx clear, MM moist  Neck: supple, trachea midline, no adenopathy  Heart: RRR, no MRG, no JVD, no peripheral edema  Lungs: CTA b/l, non-labored respirations  Abd: soft, NT, ND, BS+, no organomegaly  Extr: warm. Right hip bandaged   Skin: dry, no rash  Neuro: alert and oriented x 1-2, normal speech, moves all extremities  Psych: normal mood, appropriate affect      Intake/Output Summary (Last 24 hours) at 9/28/2021 1413  Last data filed at 9/28/2021 0907  Gross per 24 hour   Intake 320 ml   Output 700 ml   Net -380 ml        Recent labs & imaging reviewed:  Recent Results (from the past 24 hour(s))   CBC W/O DIFF    Collection Time: 09/28/21  3:43 AM   Result Value Ref Range    WBC 11.3 (H) 3.6 - 11.0 K/uL    RBC 2.23 (L) 3.80 - 5.20 M/uL    HGB 6.5 (L) 11.5 - 16.0 g/dL    HCT 20.9 (L) 35.0 - 47.0 %    MCV 93.7 80.0 - 99.0 FL    MCH 29.1 26.0 - 34.0 PG    MCHC 31.1 30.0 - 36.5 g/dL    RDW 16.1 (H) 11.5 - 14.5 %    PLATELET 232 (L) 013 - 400 K/uL    MPV 12.2 8.9 - 12.9 FL    NRBC 0.0 0  WBC    ABSOLUTE NRBC 0.00 0.00 - 0.01 K/uL   RBC, ALLOCATE    Collection Time: 09/28/21  9:30 AM   Result Value Ref Range    HISTORY CHECKED?  Historical check performed    COVID-19 RAPID TEST    Collection Time: 09/28/21 12:47 PM   Result Value Ref Range    Specimen source Nasopharyngeal      COVID-19 rapid test Not detected NOTD       Recent Labs     09/28/21  0343 09/27/21  0318   WBC 11.3* 16.0*   HGB 6.5* 8.6*   HCT 20.9* 27.6*   * 227     Recent Labs     09/27/21  0317 09/26/21  1115   * 134*   K 4.6 4.8    106   CO2 23 24   BUN 23* 23*   CREA 0.81 0.96   GLU 139* 150*   CA 8.4* 8.8     Recent Labs     09/26/21  1115   ALT 16   AP 76   TBILI 0.5   TP 6.6   ALB 3.3*   GLOB 3.3     Recent Labs     09/26/21  1115   INR 1.4*   PTP 14.7*   APTT 33.3*      No results for input(s): FE, TIBC, PSAT, FERR in the last 72 hours. No results found for: FOL, RBCF   No results for input(s): PH, PCO2, PO2 in the last 72 hours. No results for input(s): CPK, CKNDX, TROIQ in the last 72 hours.     No lab exists for component: CPKMB  No results found for: CHOL, CHOLX, CHLST, CHOLV, HDL, HDLP, LDL, LDLC, DLDLP, TGLX, TRIGL, TRIGP, CHHD, CHHDX  Lab Results   Component Value Date/Time    Glucose (POC) 133 (H) 09/27/2021 07:02 AM    Glucose (POC) 177 (H) 09/26/2021 11:09 PM     Lab Results   Component Value Date/Time    Color YELLOW/STRAW 09/26/2021 11:15 AM    Appearance CLEAR 09/26/2021 11:15 AM    Specific gravity 1.013 09/26/2021 11:15 AM    pH (UA) 7.0 09/26/2021 11:15 AM    Protein Negative 09/26/2021 11:15 AM    Glucose Negative 09/26/2021 11:15 AM    Ketone Negative 09/26/2021 11:15 AM    Bilirubin Negative 09/26/2021 11:15 AM    Urobilinogen 0.2 09/26/2021 11:15 AM    Nitrites Negative 09/26/2021 11:15 AM    Leukocyte Esterase Negative 09/26/2021 11:15 AM       Med list reviewed  Current Facility-Administered Medications   Medication Dose Route Frequency    0.9% sodium chloride infusion 250 mL  250 mL IntraVENous PRN    acetaminophen (TYLENOL) tablet 650 mg  650 mg Oral Q6H    rivaroxaban (XARELTO) tablet 20 mg  20 mg Oral DAILY WITH LUNCH    levothyroxine (SYNTHROID) tablet 100 mcg  100 mcg Oral 6am    potassium chloride SR (KLOR-CON 10) tablet 20 mEq  20 mEq Oral DAILY    sertraline (ZOLOFT) tablet 150 mg  150 mg Oral DAILY    mirtazapine (REMERON) tablet 15 mg  15 mg Oral QHS    famotidine (PEPCID) tablet 20 mg  20 mg Oral Q24H    busPIRone (BUSPAR) tablet 15 mg  15 mg Oral BID    QUEtiapine (SEROquel) tablet 50 mg  50 mg Oral BID    sodium chloride (NS) flush 5-40 mL 5-40 mL IntraVENous Q8H    sodium chloride (NS) flush 5-40 mL  5-40 mL IntraVENous PRN    naloxone (NARCAN) injection 0.4 mg  0.4 mg IntraVENous PRN    calcium-vitamin D (OS-JULIET +D3) 500 mg-200 unit per tablet 1 Tablet  1 Tablet Oral TID WITH MEALS    senna-docusate (PERICOLACE) 8.6-50 mg per tablet 1 Tablet  1 Tablet Oral BID    polyethylene glycol (MIRALAX) packet 17 g  17 g Oral DAILY    bisacodyL (DULCOLAX) suppository 10 mg  10 mg Rectal DAILY PRN    oxyCODONE IR (ROXICODONE) tablet 2.5 mg  2.5 mg Oral Q4H PRN    oxyCODONE IR (ROXICODONE) tablet 5 mg  5 mg Oral Q4H PRN       Care Plan discussed with:  Patient/Family, Nurse and     Bel Wilson MD  Internal Medicine  Date of Service: 9/28/2021

## 2021-09-29 VITALS
HEART RATE: 90 BPM | HEIGHT: 64 IN | BODY MASS INDEX: 23.31 KG/M2 | OXYGEN SATURATION: 98 % | RESPIRATION RATE: 17 BRPM | TEMPERATURE: 98.2 F | DIASTOLIC BLOOD PRESSURE: 68 MMHG | SYSTOLIC BLOOD PRESSURE: 105 MMHG | WEIGHT: 136.5 LBS

## 2021-09-29 LAB
ABO + RH BLD: NORMAL
ANION GAP SERPL CALC-SCNC: 7 MMOL/L (ref 5–15)
BLD PROD TYP BPU: NORMAL
BLOOD GROUP ANTIBODIES SERPL: NORMAL
BPU ID: NORMAL
BUN SERPL-MCNC: 18 MG/DL (ref 6–20)
BUN/CREAT SERPL: 26 (ref 12–20)
CALCIUM SERPL-MCNC: 8.2 MG/DL (ref 8.5–10.1)
CHLORIDE SERPL-SCNC: 109 MMOL/L (ref 97–108)
CO2 SERPL-SCNC: 22 MMOL/L (ref 21–32)
CREAT SERPL-MCNC: 0.69 MG/DL (ref 0.55–1.02)
CROSSMATCH RESULT,%XM: NORMAL
ERYTHROCYTE [DISTWIDTH] IN BLOOD BY AUTOMATED COUNT: 16.5 % (ref 11.5–14.5)
GLUCOSE SERPL-MCNC: 111 MG/DL (ref 65–100)
HCT VFR BLD AUTO: 23.4 % (ref 35–47)
HGB BLD-MCNC: 7.5 G/DL (ref 11.5–16)
MCH RBC QN AUTO: 29.4 PG (ref 26–34)
MCHC RBC AUTO-ENTMCNC: 32.1 G/DL (ref 30–36.5)
MCV RBC AUTO: 91.8 FL (ref 80–99)
NRBC # BLD: 0 K/UL (ref 0–0.01)
NRBC BLD-RTO: 0 PER 100 WBC
PLATELET # BLD AUTO: 133 K/UL (ref 150–400)
PMV BLD AUTO: 10.9 FL (ref 8.9–12.9)
POTASSIUM SERPL-SCNC: 4.2 MMOL/L (ref 3.5–5.1)
RBC # BLD AUTO: 2.55 M/UL (ref 3.8–5.2)
SODIUM SERPL-SCNC: 138 MMOL/L (ref 136–145)
SPECIMEN EXP DATE BLD: NORMAL
STATUS OF UNIT,%ST: NORMAL
UNIT DIVISION, %UDIV: 0
WBC # BLD AUTO: 10.9 K/UL (ref 3.6–11)

## 2021-09-29 PROCEDURE — 36415 COLL VENOUS BLD VENIPUNCTURE: CPT

## 2021-09-29 PROCEDURE — 74011000250 HC RX REV CODE- 250: Performed by: PHYSICIAN ASSISTANT

## 2021-09-29 PROCEDURE — 85027 COMPLETE CBC AUTOMATED: CPT

## 2021-09-29 PROCEDURE — 97535 SELF CARE MNGMENT TRAINING: CPT

## 2021-09-29 PROCEDURE — 74011250636 HC RX REV CODE- 250/636: Performed by: INTERNAL MEDICINE

## 2021-09-29 PROCEDURE — 74011250637 HC RX REV CODE- 250/637: Performed by: INTERNAL MEDICINE

## 2021-09-29 PROCEDURE — 74011250637 HC RX REV CODE- 250/637: Performed by: STUDENT IN AN ORGANIZED HEALTH CARE EDUCATION/TRAINING PROGRAM

## 2021-09-29 PROCEDURE — 77030027138 HC INCENT SPIROMETER -A

## 2021-09-29 PROCEDURE — 74011250637 HC RX REV CODE- 250/637: Performed by: HOSPITALIST

## 2021-09-29 PROCEDURE — 80048 BASIC METABOLIC PNL TOTAL CA: CPT

## 2021-09-29 RX ORDER — SERTRALINE HYDROCHLORIDE 50 MG/1
150 TABLET, FILM COATED ORAL DAILY
Qty: 30 TABLET | Refills: 0 | Status: SHIPPED
Start: 2021-09-29

## 2021-09-29 RX ORDER — BUSPIRONE HYDROCHLORIDE 15 MG/1
15 TABLET ORAL 2 TIMES DAILY
Qty: 30 TABLET | Refills: 0 | Status: SHIPPED
Start: 2021-09-29

## 2021-09-29 RX ORDER — MIRTAZAPINE 15 MG/1
15 TABLET, FILM COATED ORAL
Qty: 30 TABLET | Refills: 0 | Status: SHIPPED
Start: 2021-09-29

## 2021-09-29 RX ORDER — POTASSIUM CHLORIDE 1500 MG/1
20 TABLET, FILM COATED, EXTENDED RELEASE ORAL DAILY
Qty: 30 TABLET | Refills: 0 | Status: SHIPPED
Start: 2021-09-29

## 2021-09-29 RX ORDER — LEVOTHYROXINE SODIUM 100 UG/1
100 TABLET ORAL
Qty: 30 TABLET | Refills: 0 | Status: SHIPPED
Start: 2021-09-30

## 2021-09-29 RX ORDER — POLYETHYLENE GLYCOL 3350 17 G/17G
17 POWDER, FOR SOLUTION ORAL DAILY
Qty: 10 PACKET | Refills: 0 | Status: SHIPPED
Start: 2021-09-29

## 2021-09-29 RX ORDER — FERROUS SULFATE, DRIED 160(50) MG
1 TABLET, EXTENDED RELEASE ORAL
Qty: 60 TABLET | Refills: 0 | Status: SHIPPED
Start: 2021-09-29

## 2021-09-29 RX ORDER — ACETAMINOPHEN 325 MG/1
650 TABLET ORAL EVERY 6 HOURS
Qty: 10 TABLET | Refills: 0 | Status: SHIPPED
Start: 2021-09-29

## 2021-09-29 RX ORDER — QUETIAPINE FUMARATE 50 MG/1
50 TABLET, FILM COATED ORAL 2 TIMES DAILY
Qty: 60 TABLET | Refills: 0 | Status: SHIPPED
Start: 2021-09-29

## 2021-09-29 RX ORDER — AMOXICILLIN 250 MG
1 CAPSULE ORAL 2 TIMES DAILY
Qty: 20 TABLET | Refills: 0 | Status: SHIPPED
Start: 2021-09-29

## 2021-09-29 RX ORDER — LIDOCAINE 4 G/100G
1 PATCH TOPICAL EVERY 24 HOURS
Status: DISCONTINUED | OUTPATIENT
Start: 2021-09-29 | End: 2021-09-29 | Stop reason: HOSPADM

## 2021-09-29 RX ADMIN — RIVAROXABAN 20 MG: 20 TABLET, FILM COATED ORAL at 12:25

## 2021-09-29 RX ADMIN — CALCIUM CARBONATE-VITAMIN D TAB 500 MG-200 UNIT 1 TABLET: 500-200 TAB at 12:25

## 2021-09-29 RX ADMIN — LEVOTHYROXINE SODIUM 100 MCG: 0.1 TABLET ORAL at 06:51

## 2021-09-29 RX ADMIN — QUETIAPINE FUMARATE 50 MG: 25 TABLET ORAL at 17:44

## 2021-09-29 RX ADMIN — SODIUM CHLORIDE 75 ML/HR: 900 INJECTION, SOLUTION INTRAVENOUS at 05:21

## 2021-09-29 RX ADMIN — BUSPIRONE HYDROCHLORIDE 15 MG: 10 TABLET ORAL at 09:43

## 2021-09-29 RX ADMIN — CALCIUM CARBONATE-VITAMIN D TAB 500 MG-200 UNIT 1 TABLET: 500-200 TAB at 17:44

## 2021-09-29 RX ADMIN — DOCUSATE SODIUM 50 MG AND SENNOSIDES 8.6 MG 1 TABLET: 8.6; 5 TABLET, FILM COATED ORAL at 09:44

## 2021-09-29 RX ADMIN — ACETAMINOPHEN 650 MG: 325 TABLET ORAL at 17:44

## 2021-09-29 RX ADMIN — BUSPIRONE HYDROCHLORIDE 15 MG: 10 TABLET ORAL at 17:44

## 2021-09-29 RX ADMIN — QUETIAPINE FUMARATE 50 MG: 25 TABLET ORAL at 09:44

## 2021-09-29 RX ADMIN — CALCIUM CARBONATE-VITAMIN D TAB 500 MG-200 UNIT 1 TABLET: 500-200 TAB at 09:44

## 2021-09-29 RX ADMIN — POTASSIUM CHLORIDE 20 MEQ: 750 TABLET, FILM COATED, EXTENDED RELEASE ORAL at 09:44

## 2021-09-29 RX ADMIN — DOCUSATE SODIUM 50 MG AND SENNOSIDES 8.6 MG 1 TABLET: 8.6; 5 TABLET, FILM COATED ORAL at 17:44

## 2021-09-29 RX ADMIN — OXYCODONE 2.5 MG: 5 TABLET ORAL at 06:52

## 2021-09-29 RX ADMIN — FAMOTIDINE 20 MG: 20 TABLET ORAL at 17:44

## 2021-09-29 RX ADMIN — ACETAMINOPHEN 650 MG: 325 TABLET ORAL at 01:19

## 2021-09-29 RX ADMIN — ACETAMINOPHEN 650 MG: 325 TABLET ORAL at 11:18

## 2021-09-29 RX ADMIN — POLYETHYLENE GLYCOL 3350 17 G: 17 POWDER, FOR SOLUTION ORAL at 09:45

## 2021-09-29 RX ADMIN — SERTRALINE 150 MG: 50 TABLET, FILM COATED ORAL at 09:44

## 2021-09-29 NOTE — PROGRESS NOTES
Notified by RN that patient is not voiding. She is on NS at 75 cc/hr. She is s/p ORIF on 9/26. Bladder scan shows 425cc urine. staight cath ordered.

## 2021-09-29 NOTE — PROGRESS NOTES
Voided small amount on the diaper. Post void bladder scanned for 420 ml and straight cath for 375 ml of clear yellow urine.

## 2021-09-29 NOTE — PROGRESS NOTES
Orthopedic Progress Note    S: Dementia limits hx; daughter states pt appears in more pain, but just received medicine; no new complaints, pain isolated to R hip; Denies numbness, tingling, focal weakness, cp, sob, fever, chills    O: NAD, respirations unlabored; Dressings CDI thigh soft, no edema, no posterior calf ttp; DF/PF 5/5, SILT; Cap refill brisk, foot warm, DP2+    Patient Vitals for the past 4 hrs:   Temp Pulse BP   09/29/21 0847 98.8 °F (37.1 °C) 79 123/75     Recent Labs     09/29/21  0123 09/28/21  0343 09/27/21  0318 09/27/21  0317   HGB 7.5* 6.5*   < >  --    HCT 23.4* 20.9*   < >  --    * 140*   < >  --    BUN 18  --   --  23*   CREA 0.69  --   --  0.81   K 4.2  --   --  4.6     --   --  135*    < > = values in this interval not displayed. A/P:  Procedure: Procedure(s):  Alli gamma nail. [facility] arm  Post Op day: 3 Days Post-Op    -hgb7.5; no evidence of uncontrolled bleeding, hematoma on surgical leg  - DVT ppx - resume Xarelto; SCDs  - PT/OT - WBAT  - Pain - Added Lido Patch, Tylenol on kevin, Oxy prn; Ice, Elevation, Ace wrap as needed  - Dressing - Leave in place if it remains dry and intact; change as needed for saturation with dry sterile island dressing  - Dispo - Pending PT/OT recs; needs f/u in 3 weeks with Dr. Katrina Ahumada; refer to DC instructions for further details. Call with questions, will sign off.      NATALIE Amos  Orthopedic Trauma Service  [facility]

## 2021-09-29 NOTE — PROGRESS NOTES
Physician Progress Note      Hiwot Ohara  CSN #:                  [id number]  :                       1940  ADMIT DATE:       2021 10:49 AM  DISCH DATE:  RESPONDING  PROVIDER #:        Pierre Marques MD          QUERY TEXT:    Pt admitted with femur fracture. Not[facility] . If possible, please document in progress notes and discharge summary the clinical indicators to support this diagnosis on current admission or document if Dementia with behavioral issues is PMH only. The medical record reflects the following:  Risk Factors: dementia    Clinical Indicators:    ED PN -  Dementia without behavioral disturbance,    H&P-  History of dementia with behavioral issues. We will continue current medications. [place] PN -  Dementia with behavioral issues-  continue current medications buspar, seroquel, zoloft and remeron    Treatment: Remeron 1mg PO HS; Seroquel 50mg PO BID; Zoloft 150mg PO daily; Buspar 15mg PO BID    Thank you,  Cuco Goldstein RN, BSN, CCDS, [place], [place]  Clinical Documentation  [phone] or [phone]  Options provided:  -- Dementia with behavioral issues currently being treated/evaluated as evidenced by, Please document supportive evidence. -- Dementia with behavioral issues is PMH only  -- Other - I will add my own diagnosis  -- Disagree - Not applicable / Not valid  -- Disagree - Clinically unable to determine / Unknown  -- Refer to Clinical Documentation Reviewer    PROVIDER RESPONSE TEXT:    Dementia with behavioral issues is PMH only.     Query created by: Wally Melendez on 2021 11:38 AM      Electronically signed by:  Pierre Marques MD 2021 11:50 AM

## 2021-09-29 NOTE — PROGRESS NOTES
Bedside shift change report given to John Kessler (oncoming nurse) by Guillermo Marks RN (offgoing nurse). Report included the following information SBAR, Kardex, Output.

## 2021-09-29 NOTE — PROGRESS NOTES
ROCHELLE: d/c to [facility] today; Rapid Covid test negative 9/28; Pt is currently on 2L/NC 02; BLS Transport scheduled for 4pm; CM faxed AVS & Covid test result to [facility] at [phone]    RUR: 16%    Transition of Care Plan to SNF/Rehab    SNF/Rehab Transition:  [facility] and meets criteria for admission. Patient will transported by [facility] and expected to leave at 4pm.    Communication to Patient/Family:  Met with patient and (identified care giver) and they are agreeable to the transition plan. Communication to SNF/Rehab:  Bedside RN, Ruthy Malik, has been notified to update the transition plan to the facility and call report (phone number [phone]). Discharge information has been updated on the AVS.     Discharge instructions to be fax'd to facility at [facility] # [phone]). Nursing Please include all hard scripts for controlled substances, med rec and dc summary, and AVS in packet.      Reviewed and confirmed with facility, [facility], can manage the patient care needs for the following:     SNF/Rehab Transition:  [facility] the patient care needs for the following:     Sherald Spurling with (X) only those applicable:    Medication:  [x]  Medications will be available at the facility  []  IV Antibiotics   []  Controlled Substance - hard copy to be sent with patient   []  Weekly Labs   Documents:  [] Hard RX  [x] MAR  [x] Kardex  [x] AVS  [x]Transfer Summary  [x]Discharge   Equipment:  []  CPAP/BiPAP  []  Wound Vacuum  []  Zuniga or Urinary Device  []  PICC/Central Line  []  Nebulizer  []  Ventilator   Treatment:  []Isolation (for MRSA, VRE, etc.)  []Surgical Drain Management  []Tracheostomy Care  []Dressing Changes  []Dialysis with transportation and chair time   []PEG Care  [x]Oxygen  []Daily Weights for Heart Failure   Dietary:  []Any diet limitations  []Tube Feedings   []Total Parenteral Management (TPN)   Eligible for Medicaid Long Term Services and Supports  Yes:  [] Eligible for medical assistance or will become eligible within 180 days and UAI completed. [] Provider/Patient and/or support system has requested screening. [] UAI copy provided to patient or responsible party,   [] UAI unavailable at discharge will send once processed to SNF provider. [] UAI unavailable at discharged mailed to patient  No:   [] Private pay and is not financially eligible for Medicaid within the next 180 days. [] Reside out-of-state. [] A residents of a state owned/operated facility that is licensed  by [place] [address] [employer] [place] or [facility]  [] Enrollment in [facility] services  [] [address]  [x] Patient /Family declines to have screening completed or provide financial information for screening     Financial Resources:  Medicaid    [] Initiated and application pending   [] Full coverage     Advanced Care Plan:  []Surrogate Decision Maker of Care  []POA  []Communicated Code Status DNR   Other     Financial Resources:  Medicaid    [] Initiated and application pending   [] Full coverage     Advanced Care Plan:  []Surrogate Decision Maker of Care  []POA  []Communicated Code Status  DNR   Other     9806-CM reviewed pt chart & discussed pt case with Attending. As per Attending, pt is medically ready for d/c today, d/c order in place. CM contacted [facility] [phone] and she can accept pt at 4pm today. CM scheduled transport for 4pm today. CM informed nurse and Attending of d/c plan. CM informed pt of plan & daughter, Luis Alberto Shirley [phone] as well. CM to follow. Adryan Rodriguez RN BSN CCM Medicare pt has received, reviewed, and signed 2nd IM letter informing them of their right to appeal the discharge. Signed copy has been placed on pt bedside chart.

## 2021-09-29 NOTE — PROGRESS NOTES
Occupational Therapy Note:     [employer] employee alerted terahpsit pt's feet were falling out of the bed. Therapist entered the room and did find pt's feet hanging out of the bed. Pt oriented to her name and able to state she did have a fracture in \"this bone\" pointing to the femur. Pt assisted to reposition in the bed and LILI heels floating. Pt resting comfortably and drifting quickly back to sleep. Ice applied to hip. A&P:  Nursing notified of intervention. Pt planned to leaved today to progress with skilled rehab. Will follow should she remain in acute care setting.        Juve Stauffer OT  Time Calculation: 10 mins

## 2021-09-29 NOTE — PROGRESS NOTES
TRANSFER - OUT REPORT:    Verbal report given to Lori Warner RN on [address] [facility]  being transferred to [facility], a skilled rehab facility, for routine progression of care       Report consisted of patients Situation, Background, Assessment and   Recommendations(SBAR). Information from the following report(s) SBAR, Kardex, Intake/Output, MAR and Recent Results was reviewed with the receiving nurse. Opportunity for questions and clarification was provided. No further questions at this time.     Patient transported with: Alethea Moyer

## 2021-09-29 NOTE — PROGRESS NOTES
Bedside shift change report given to Evita (oncoming nurse) by Bj Panchal (offgoing nurse). Report included the following information SBAR, Kardex, Intake/Output and MAR.

## 2021-09-29 NOTE — DISCHARGE SUMMARY
Discharge Instructions/Summary     Patient: Lukas Polo       MRN: [id number]       YOB: 1940       Age: 80 y.o. Date of admission:  9/26/2021    Date of discharge:  9/29/2021    Primary care provider:  Leonora Foote MD     Admitting provider:  Daja Terrell MD    Discharging provider:  Liz Mendoza MD , to contact this individual call [phone] and ask the  to page, if unavailable ask for the triage hospitalist to be paged. Consultations  · IP CONSULT TO CARDIOLOGY  · IP CONSULT TO CARDIOLOGY    Procedures/Surgeries  Procedure(s):  Alli gamma nail. Dar Push tableC arm    Discharge destination: [facility] - [facility] [address] 1938 . The patient is stable for discharge. Admission diagnosis  · Hip fracture ([address] [facility] COURSE:    The patient is a very pleasant 27-year-old  female who has previous history significant for chronic atrial fibrillation along with dementia and hypothyroidism.  She currently resides at [address] [place] [address] patient was sent to the emergency room as she had a fall early this morning.  The patient lives with her sister in a single room in [facility].  Her sister fell down this morning.  The patient  decided to help her, but Hannah Smith also fell down.  In the emergency room, the patient's imaging was suggestive of a fracture of the right femoral intertrochanteric area.  We are asked to admit the patient. Lucy Rodney patient's daughter is here who tells me that she had a right hip fracture last year.  At that time, she was treated at [facility] as she has a narrow trachea and anesthesiologist told them to go to [facility].  At this time, the patient has no other complaints.     Right comminuted femoral intertrochanteric fracture s/p ORIF on 9/26  Secondary to fall  - appreciate Orthopedics input  - pain meds with scheduled Tylenol   - DVT ppx with Xarelto  - PT/OT - SNF     Anemia - post op blood loss  - s/p 1U PRBC 9/28  - hb low stable.      Hx  remote MI and Paroxymal atrial fibrillation, currently in sinus rhythm.    - Cardiology cleared for surgery. - rate controlled      Hypothyroidism.  Continue levothyroxine. Dementia with behavioral issues-  continue current medications buspar, seroquel, zoloft and remeron    Current Discharge Medication List      START taking these medications    Details   acetaminophen (TYLENOL) 325 mg tablet Take 2 Tablets by mouth every six (6) hours. Qty: 10 Tablet, Refills: 0  Start date: 9/29/2021      busPIRone (BUSPAR) 15 mg tablet Take 1 Tablet by mouth two (2) times a day. Qty: 30 Tablet, Refills: 0  Start date: 9/29/2021      calcium-vitamin D (OS-JULIET +D3) 500 mg-200 unit per tablet Take 1 Tablet by mouth three (3) times daily (with meals). Qty: 60 Tablet, Refills: 0  Start date: 9/29/2021      levothyroxine (SYNTHROID) 100 mcg tablet Take 1 Tablet by mouth every morning. Qty: 30 Tablet, Refills: 0  Start date: 9/30/2021      mirtazapine (REMERON) 15 mg tablet Take 1 Tablet by mouth nightly. Qty: 30 Tablet, Refills: 0  Start date: 9/29/2021      polyethylene glycol (MIRALAX) 17 gram packet Take 1 Packet by mouth daily. Qty: 10 Packet, Refills: 0  Start date: 9/29/2021      potassium chloride SR (K-TAB) 20 mEq tablet Take 1 Tablet by mouth daily. Qty: 30 Tablet, Refills: 0  Start date: 9/29/2021      QUEtiapine (SEROquel) 50 mg tablet Take 1 Tablet by mouth two (2) times a day. Qty: 60 Tablet, Refills: 0  Start date: 9/29/2021      sertraline (ZOLOFT) 50 mg tablet Take 3 Tablets by mouth daily. Qty: 30 Tablet, Refills: 0  Start date: 9/29/2021      rivaroxaban (XARELTO) 20 mg tab tablet Take 1 Tablet by mouth daily (with lunch). Qty: 30 Tablet, Refills: 0  Start date: 9/29/2021      senna-docusate (PERICOLACE) 8.6-50 mg per tablet Take 1 Tablet by mouth two (2) times a day.   Qty: 20 Tablet, Refills: 0  Start date: 9/29/2021               FOLLOW-UP CARE RECOMMENDATIONS/TESTING/NURSING ORDERS:  · PT/OT  · Wean off oxygen  · Cbc in 1 week        DIET:  Regular Diet    ACTIVITY:  Activity as tolerated, PT/OT to evaluate and treat and OOB for meals    WOUND CARE: Leave in place if it remains dry and intact; change as needed for saturation with dry sterile island dressing    APPOINTMENTS:  · Follow-up with primary care provider, Dr. Mauro Dietz, MD  -  Please call to set up an appointment to be seen in 1 week   · Ortho in 3 weeks       PENDING TEST RESULTS:  At the time of discharge the following test results are still pending: none . Please review these results as they become available. Specific symptoms to watch for: chest pain, shortness of breath, fever, chills, nausea, vomiting, diarrhea, change in mentation, falling, weakness, bleeding. GOALS OF CARE:  X  Eventual return to home/independent/assisted living     Long term SNF      Hospice     No rehospitalization     SOCIAL HISTORY:     Social History     Socioeconomic History    Marital status:      Spouse name: Not on file    Number of children: Not on file    Years of education: Not on file    Highest education level: Not on file   Occupational History    Not on file   Tobacco Use    Smoking status: Not on file   Substance and Sexual Activity    Alcohol use: Not on file    Drug use: Not on file    Sexual activity: Not on file   Other Topics Concern    Not on file   Social History Narrative    Not on file     Social Determinants of Health     Financial Resource Strain:     Difficulty of Paying Living Expenses:    Food Insecurity:     Worried About Running Out of Food in the Last Year:     [address] in the Last Year:    Transportation Needs:     Lack of Transportation (Medical):      Lack of Transportation (Non-Medical):    Physical Activity:     Days of Exercise per Week:     Minutes of Exercise per Session: Stress:     Feeling of Stress :    Social Connections:     Frequency of Communication with Friends and Family:     Frequency of Social Gatherings with Friends and Family:     Attends [employer]oration[employer] Services:     Active Member of Clubs or Organizations:     Attends Club or Organization Meetings:     Marital Status:    Intimate Partner Violence:     Fear of Current or Ex-Partner:     Emotionally Abused:     Physically Abused:     Sexually Abused:        Patient condition at discharge:   Functional status  X  Poor      Deconditioned      Independent   Cognition    Lucid     Forgetful (some sensescence)   X  Dementia   Catheters/lines (plus indication)    Zuniga     PICC      PEG         Code status    Full code    X  DNR         CHRONIC MEDICAL CONDITIONS:  Problem List as of 9/29/2021 Never Reviewed        Codes Class Noted - Resolved    Hip fracture ([facility] [address] ICD-10-CM: S72.009A  ICD-9-CM: 820.8  9/26/2021 - Present                  Physical examination at discharge  Visit Vitals  /75 (BP 1 Location: Right upper arm, BP Patient Position: At rest)   Pulse 79   Temp 98.8 °F (37.1 °C)   Resp 16   Ht 5' 4\" (1.626 m)   Wt 61.9 kg (136 lb 8 oz)   SpO2 94%   BMI 23.43 kg/m²     Gen: NAD, elderly   HEENT: anicteric sclerae, normal conjunctiva, oropharynx clear, MM moist  Neck: supple, trachea midline, no adenopathy  Heart: RRR, no MRG, no JVD, no peripheral edema  Lungs: CTA b/l, non-labored respirations  Abd: soft, NT, ND, BS+, no organomegaly  Extr: warm. Right hip bandaged   Skin: dry, no rash  Neuro: alert and oriented x 1-2, normal speech, moves all extremities  Psych: normal mood, appropriate affect    Significant Diagnostic Studies:   9/26/2021: BUN 23 MG/DL* (Ref range: 6 - 20 MG/DL); Calcium 8.8 MG/DL (Ref range: 8.5 - 10.1 MG/DL); CO2 24 mmol/L (Ref range: 21 - 32 mmol/L); Creatinine 0.96 MG/DL (Ref range: 0.55 - 1.02 MG/DL);  Glucose 150 mg/dL* (Ref range: 65 - 100 mg/dL); HCT 34.1 %* (Ref range: 35.0 - 47.0 %); HGB 11.1 g/dL* (Ref range: 11.5 - 16.0 g/dL); Potassium 4.8 mmol/L (Ref range: 3.5 - 5.1 mmol/L); Sodium 134 mmol/L* (Ref range: 136 - 145 mmol/L)  9/27/2021: BUN 23 MG/DL* (Ref range: 6 - 20 MG/DL); Calcium 8.4 MG/DL* (Ref range: 8.5 - 10.1 MG/DL); CO2 23 mmol/L (Ref range: 21 - 32 mmol/L); Creatinine 0.81 MG/DL (Ref range: 0.55 - 1.02 MG/DL); Glucose 139 mg/dL* (Ref range: 65 - 100 mg/dL); HCT 27.6 %* (Ref range: 35.0 - 47.0 %); HGB 8.6 g/dL* (Ref range: 11.5 - 16.0 g/dL); Potassium 4.6 mmol/L (Ref range: 3.5 - 5.1 mmol/L); Sodium 135 mmol/L* (Ref range: 136 - 145 mmol/L)  Recent Labs     09/29/21  0123 09/28/21  0343   WBC 10.9 11.3*   HGB 7.5* 6.5*   HCT 23.4* 20.9*   * 140*     Recent Labs     09/29/21  0123 09/27/21  0317 09/26/21  1115    135* 134*   K 4.2 4.6 4.8   * 108 106   CO2 22 23 24   BUN 18 23* 23*   CREA 0.69 0.81 0.96   * 139* 150*   CA 8.2* 8.4* 8.8     Recent Labs     09/26/21  1115   AP 76   TP 6.6   ALB 3.3*   GLOB 3.3     Recent Labs     09/26/21  1115   INR 1.4*   PTP 14.7*   APTT 33.3*      No results for input(s): FE, TIBC, PSAT, FERR in the last 72 hours. No results for input(s): PH, PCO2, PO2 in the last 72 hours. No results for input(s): CPK, CKMB in the last 72 hours. No lab exists for component: TROPONINI  No components found for: [place]    Pertinent imaging studies:    Per EMR       Time spent on discharge related activities today greater than 30 minutes. Signed:  Allen Payton MD                 Hospitalist                 9/29/2021                 9:15 AM        Cc:  Angie Guerin MD

## 2021-09-29 NOTE — DISCHARGE INSTRUCTIONS
Patient Discharge Instructions    Holly Johnson / [id number] : 1940    Admitted 2021 Discharged: 2021 9:13 AM     Discharging provider: Danni Monge MD. To contact this provider call [phone] and ask  to page, if not available ask for triage hospitalist to be paged. Primary care provider: Mk Arroyo MD  . . . . . . . . . . . . . . . . . . . . . . . . . . . . . . . . . . . . . . . . . . . . . . . . . . . . . . . . . . . . . . . . . . . . . . . Jess Garcias FINAL [address] [facility] COURSE:    Right comminuted femoral intertrochanteric fracture s/p ORIF on   Secondary to fall  - appreciate Orthopedics input  - pain meds - scheduled Tylenol   - DVT ppx with Xarelto  - PT/OT - SNF     Anemia - post op blood loss  - s/p 1U PRBC    - monitor hb     Hx  remote MI and Paroxymal atrial fibrillation, currently in sinus rhythm.    - Cardiology cleared for surgery. - rate controlled   - on Xarelto      Hypothyroidism.  Continue levothyroxine. Dementia with behavioral issues-  continue current medications buspar, seroquel, zoloft and remeron    FOLLOW-UP CARE RECOMMENDATIONS/TESTING/NURSING ORDERS:  · PT/OT  · Cbc , bmp in 1 week   · Wean off oxygen     DIET:  Regular Diet    ACTIVITY:  Activity as tolerated, PT/OT to evaluate and treat and OOB for meals    WOUND CARE: May leave dressing and ace wrap in place if remains C/D/I. Change prn for saturation with dry sterile dressing    APPOINTMENTS:  · Follow-up with primary care provider, Dr. Mk Arroyo MD  -  Please call to set up an appointment to be seen in  1 week   · Orthopedics in 3 weeks     It is very important that you keep follow-up appointment(s). Bring discharge papers, medication list (and/or medication bottles) to follow-up appointments for review by outpatient provider(s). PENDING TEST RESULTS:  At the time of discharge the following test results are still pending: none .    Please review these results as they become available. Specific symptoms to watch for: chest pain, shortness of breath, fever, chills, nausea, vomiting, diarrhea, change in mentation, falling, weakness, bleeding. GOALS OF CARE:  X  Eventual return to home/independent/assisted living     Long term SNF      Hospice     No rehospitalization     Patient condition at discharge:   Functional status  X  Poor      Deconditioned      Independent   Cognition    Lucid     Forgetful (some sensescence)   X  Dementia   Catheters/lines (plus indication)    Zuniga     PICC      PEG         Code status    Full code    X  DNR         CHRONIC MEDICAL CONDITIONS:  Problem List as of 9/29/2021 Never Reviewed        Codes Class Noted - Resolved    Hip fracture ([facility] [address] ICD-10-CM: M79.693X  ICD-9-CM: 820.8  9/26/2021 - Present                Information obtained by :   I understand that if any problems occur once I am at home I am to contact my physician. I understand and acknowledge receipt of the instructions indicated above. Physician's or R.N.'s Signature                                                                  Date/Time                                                                                                                                              Patient or Representative Signature                                                          Date/Time      Post op Discharge Instructions Hip Fracture   Reese Livingston MD  [facility]  [phone]    Patient Name: Haja Kolb  Date of admission:  9/26/2021  Date of procedure: 9/26/2021   Procedure: Procedure(s):  Alli gamma nail. [facility] arm  PCP: Mera Brooks MD  Date of discharge: No discharge date for patient encounter. Follow up office visit    See Dr. Kaylah Elizabeth approximately 3 weeks from date of surgery. Call [phone] to make an appointment. Activity   Walk with your walker as instructed by your physical therapist. Continue using your walker until seen for follow-up visit.  Perform your exercise routine 3 times a day as instructed by the physical therapist.  [facility] Get up frequently and walk (with assistance as needed)   You may not drive    [facility]   Keep a dressing on your incision and change daily. Once you incision is not draining, you may leave it open to air  [facility] hands thoroughly before changing the dressing.  You may take a shower when your incision is dry. Do not take a tub bath or go swimming. Preventing blood clots   Resume Xarelto at your normal dose    Pain management   Take pain medication as prescribed; decrease the amount you take as your pain lessens   Avoid alcoholic beverages while taking pain medications   Place an ice bag on the hip for 15-20 minutes after exercising and as needed throughout the day and night    Diet   Resume usual diet; encourage fluids; eat foods high in fiber, calcium and vitamin D.   You may want to take a stool softener (such as Senokot-S or Colace) to prevent constipation while you are taking pain medication.  If constipation occurs, take a laxative (such as Dulcolax tablets, Miralax, or a suppository)      When to call your Orthopaedic Surgeon.  If you call after 5pm or on a weekend, the on call physician will be contacted   Pain that is not relieved by pain medication, ice, activity   Signs of infection  o Incision is reddened  o Incision continues to drain; drainage has an odor  o Persistent fever over 101 degrees   Signs of a blood clot in your leg  o Calf pain, tenderness, redness and/or swelling of lower leg    When to call your Primary Care Physician   Concerns about medical conditions such as diabetes, high blood pressure, asthma, congestive heart failure   Call if blood sugars are elevated, persistent headache or dizziness, coughing or congestion, constipation or diarrhea, burning with urination, abnormal heart rate (slow or fast)    When to call 911 and go to the nearest emergency room   Acute onset of chest pain, shortness of breath, difficulty breathing

## 2021-11-13 ENCOUNTER — APPOINTMENT (OUTPATIENT)
Dept: GENERAL RADIOLOGY | Age: 81
End: 2021-11-13
Attending: STUDENT IN AN ORGANIZED HEALTH CARE EDUCATION/TRAINING PROGRAM
Payer: MEDICARE

## 2021-11-13 ENCOUNTER — APPOINTMENT (OUTPATIENT)
Dept: CT IMAGING | Age: 81
End: 2021-11-13
Attending: STUDENT IN AN ORGANIZED HEALTH CARE EDUCATION/TRAINING PROGRAM
Payer: MEDICARE

## 2021-11-13 ENCOUNTER — HOSPITAL ENCOUNTER (EMERGENCY)
Age: 81
Discharge: HOME | End: 2021-11-13
Attending: STUDENT IN AN ORGANIZED HEALTH CARE EDUCATION/TRAINING PROGRAM
Payer: MEDICARE

## 2021-11-13 VITALS
OXYGEN SATURATION: 100 % | WEIGHT: 114.64 LBS | SYSTOLIC BLOOD PRESSURE: 124 MMHG | HEIGHT: 66 IN | TEMPERATURE: 97.8 F | RESPIRATION RATE: 16 BRPM | DIASTOLIC BLOOD PRESSURE: 77 MMHG | HEART RATE: 86 BPM | BODY MASS INDEX: 18.42 KG/M2
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
## 2021-11-13 DIAGNOSIS — S42.035A CLOSED NONDISPLACED FRACTURE OF ACROMIAL END OF LEFT CLAVICLE, INITIAL ENCOUNTER: ICD-10-CM

## 2021-11-13 PROCEDURE — 99282 EMERGENCY DEPT VISIT SF MDM: CPT

## 2021-11-13 PROCEDURE — 70450 CT HEAD/BRAIN W/O DYE: CPT

## 2021-11-13 PROCEDURE — 73030 X-RAY EXAM OF SHOULDER: CPT

## 2021-11-13 NOTE — ED TRIAGE NOTES
Pt residing at [facility] for rehabillitation following right hip fracture. Pt was found on the ground this morning around 0500. Pt had outpatient x-ray which showed a fractured left collarbone. Pt with hx of dementia. Pt alert with skin pwd and respirations even and unlabored. Mental status at baseline per patient's daughter. Strong radial pulse.  (Deformity noted to left wrist- old injury per family)

## 2021-11-20 NOTE — ED PROVIDER NOTES
Patient is an 43-year-old female present emergency department for a fall. Patient currently lives at [facility] for rehabilitation status post right hip fracture. Patient was found on the ground this morning around 5 AM outpatient x-ray showed a left clavicle fracture. Was sent to the emergency department to obtain a sling. Patient has a history of dementia and is on Xarelto. It is unclear if patient struck her head when she fell she is complaining of left shoulder pain denies any other symptoms. Past Medical History:   Diagnosis Date    Arrhythmia     Paroxysmal AFib    CAD (coronary artery disease)     Hip fracture ([address] 10/2021    Hypertension     Psychiatric disorder     dementia       Past Surgical History:   Procedure Laterality Date    HX HIP FRACTURE TX Right          History reviewed. No pertinent family history. Social History     Socioeconomic History    Marital status:      Spouse name: Not on file    Number of children: Not on file    Years of education: Not on file    Highest education level: Not on file   Occupational History    Not on file   Tobacco Use    Smoking status: Never Smoker    Smokeless tobacco: Never Used   Substance and Sexual Activity    Alcohol use: Never    Drug use: Never    Sexual activity: Not on file   Other Topics Concern    Not on file   Social History Narrative    Not on file     Social Determinants of Health     Financial Resource Strain:     Difficulty of Paying Living Expenses: Not on file   Food Insecurity:     Worried About Running Out of Food in the Last Year: Not on file    [place] of Food in the Last Year: Not on file   Transportation Needs:     Lack of Transportation (Medical): Not on file    Lack of Transportation (Non-Medical):  Not on file   Physical Activity:     Days of Exercise per Week: Not on file    Minutes of Exercise per Session: Not on file   Stress:     Feeling of Stress : Not on file   Social Connections:     Frequency of Communication with Friends and Family: Not on file    Frequency of Social Gatherings with Friends and Family: Not on file    Attends Jehovah's witness Services: Not on file    Active Member of Clubs or Organizations: Not on file    Attends Club or Organization Meetings: Not on file    Marital Status: Not on file   Intimate Partner Violence:     Fear of Current or Ex-Partner: Not on file    Emotionally Abused: Not on file    Physically Abused: Not on file    Sexually Abused: Not on file   Housing Stability:     Unable to Pay for Housing in the Last Year: Not on file    Number of [place] in the Last Year: Not on file    Unstable Housing in the Last Year: Not on file         ALLERGIES: Patient has no known allergies. Review of Systems   Musculoskeletal: Positive for arthralgias. All other systems reviewed and are negative. Vitals:    11/13/21 1405   BP: 124/77   Pulse: 86   Resp: 16   Temp: 97.8 °F (36.6 °C)   SpO2: 100%   Weight: 52 kg (114 lb 10.2 oz)   Height: 5' 6\" (1.676 m)            Physical Exam  Constitutional:       Appearance: Normal appearance. HENT:      Head: Normocephalic and atraumatic. Cardiovascular:      Rate and Rhythm: Normal rate. Rhythm irregular. Pulses: Normal pulses. Heart sounds: Normal heart sounds. Musculoskeletal:      Left shoulder: Tenderness and bony tenderness present. Decreased range of motion. Cervical back: Normal range of motion and neck supple. No rigidity or tenderness. Comments: Tenderness over the lateral aspect of the clavicle   Skin:     General: Skin is warm and dry. Neurological:      Mental Status: She is alert. Mental status is at baseline.    Psychiatric:         Mood and Affect: Mood normal.         Behavior: Behavior normal.          MDM  Number of Diagnoses or Management Options  Closed nondisplaced fracture of acromial end of left clavicle, initial encounter  Fall, initial encounter  Diagnosis management comments: A/P: Clavicle fracture, fall, head injury. 59-year-old female with dementia on Xarelto will obtain CT head, C-spine reviewed x-rays of clavicle obtained earlier today we will place patient in sling.          Procedures

## 2021-11-29 ENCOUNTER — OFFICE VISIT (OUTPATIENT)
Dept: ORTHOPEDIC SURGERY | Age: 81
End: 2021-11-29
Payer: MEDICARE

## 2021-11-29 DIAGNOSIS — S42.035A CLOSED NONDISPLACED FRACTURE OF ACROMIAL END OF LEFT CLAVICLE, INITIAL ENCOUNTER: ICD-10-CM

## 2021-11-29 DIAGNOSIS — S42.035A NONDISPLACED FRACTURE OF LATERAL END OF LEFT CLAVICLE, INITIAL ENCOUNTER FOR CLOSED FRACTURE: Primary | ICD-10-CM

## 2021-11-29 PROCEDURE — 1101F PT FALLS ASSESS-DOCD LE1/YR: CPT | Performed by: ORTHOPAEDIC SURGERY

## 2021-11-29 PROCEDURE — 1090F PRES/ABSN URINE INCON ASSESS: CPT | Performed by: ORTHOPAEDIC SURGERY

## 2021-11-29 PROCEDURE — G8432 DEP SCR NOT DOC, RNG: HCPCS | Performed by: ORTHOPAEDIC SURGERY

## 2021-11-29 PROCEDURE — G8427 DOCREV CUR MEDS BY ELIG CLIN: HCPCS | Performed by: ORTHOPAEDIC SURGERY

## 2021-11-29 PROCEDURE — 99203 OFFICE O/P NEW LOW 30 MIN: CPT | Performed by: ORTHOPAEDIC SURGERY

## 2021-11-29 PROCEDURE — G8536 NO DOC ELDER MAL SCRN: HCPCS | Performed by: ORTHOPAEDIC SURGERY

## 2021-11-29 PROCEDURE — G8420 CALC BMI NORM PARAMETERS: HCPCS | Performed by: ORTHOPAEDIC SURGERY

## 2021-11-29 PROCEDURE — G8400 PT W/DXA NO RESULTS DOC: HCPCS | Performed by: ORTHOPAEDIC SURGERY

## 2021-11-29 NOTE — PROGRESS NOTES
Melany Kolb (: 1940) is a 80 y.o. female, patient, here for evaluation of the following chief complaint(s):  No chief complaint on file. HPI:    Patient presents the office with a chief complaint of left shoulder pain. This is a patient who is here today with her daughter. Unfortunately she has severe dementia. Her daughter states she is currently recovering from a hip injury and has been sustaining many falls. She recently had a fall while in extended care facility and was noted to have a fracture of her left clavicle. She is here today for follow-up. She has not been using her sling. She admittedly states she does not have any pain. She is really using her arm today here in this office examination    No Known Allergies    Current Outpatient Medications   Medication Sig    acetaminophen (TYLENOL) 325 mg tablet Take 2 Tablets by mouth every six (6) hours.  busPIRone (BUSPAR) 15 mg tablet Take 1 Tablet by mouth two (2) times a day.  calcium-vitamin D (OS-JULIET +D3) 500 mg-200 unit per tablet Take 1 Tablet by mouth three (3) times daily (with meals).  levothyroxine (SYNTHROID) 100 mcg tablet Take 1 Tablet by mouth every morning.  mirtazapine (REMERON) 15 mg tablet Take 1 Tablet by mouth nightly.  polyethylene glycol (MIRALAX) 17 gram packet Take 1 Packet by mouth daily.  potassium chloride SR (K-TAB) 20 mEq tablet Take 1 Tablet by mouth daily.  QUEtiapine (SEROquel) 50 mg tablet Take 1 Tablet by mouth two (2) times a day.  sertraline (ZOLOFT) 50 mg tablet Take 3 Tablets by mouth daily.  rivaroxaban (XARELTO) 20 mg tab tablet Take 1 Tablet by mouth daily (with lunch).  senna-docusate (PERICOLACE) 8.6-50 mg per tablet Take 1 Tablet by mouth two (2) times a day. No current facility-administered medications for this visit.        Past Medical History:   Diagnosis Date    Arrhythmia     Paroxysmal AFib    CAD (coronary artery disease)     Hip fracture ([facility] [address] 10/2021    Hypertension     Psychiatric disorder     dementia        Past Surgical History:   Procedure Laterality Date    HX HIP FRACTURE TX Right        No family history on file. Social History     Socioeconomic History    Marital status:      Spouse name: Not on file    Number of children: Not on file    Years of education: Not on file    Highest education level: Not on file   Occupational History    Not on file   Tobacco Use    Smoking status: Never Smoker    Smokeless tobacco: Never Used   Substance and Sexual Activity    Alcohol use: Never    Drug use: Never    Sexual activity: Not on file   Other Topics Concern    Not on file   Social History Narrative    Not on file     Social Determinants of Health     Financial Resource Strain:     Difficulty of Paying Living Expenses: Not on file   Food Insecurity:     Worried About Running Out of Food in the Last Year: Not on file    [place] of Food in the Last Year: Not on file   Transportation Needs:     Lack of Transportation (Medical): Not on file    Lack of Transportation (Non-Medical):  Not on file   Physical Activity:     Days of Exercise per Week: Not on file    Minutes of Exercise per Session: Not on file   Stress:     Feeling of Stress : Not on file   Social Connections:     Frequency of Communication with Friends and Family: Not on file    Frequency of Social Gatherings with Friends and Family: Not on file    Attends Evangelical Services: Not on file    Active Member of Clubs or Organizations: Not on file    Attends Club or Organization Meetings: Not on file    Marital Status: Not on file   Intimate Partner Violence:     Fear of Current or Ex-Partner: Not on file    Emotionally Abused: Not on file    Physically Abused: Not on file    Sexually Abused: Not on file   Housing Stability:     Unable to Pay for Housing in the Last Year: Not on file    Number of [place] in the Last Year: Not on file    Unstable Housing in the Last Year: Not on file       Review of Systems   Musculoskeletal:        Left clavicle fracture       Vitals: There were no vitals taken for this visit. There is no height or weight on file to calculate BMI. Ortho Exam     Left shoulder: Resolving ecchymosis noted along the anterior aspect the shoulder. No effusion. She is moving arm freely with no pain. She has no crepitation. She has at least 4/5 strength throughout. Neurovascular examination is intact. Right shoulder: Patient has no pain with manipulation of right shoulder. She has no instability. She has no crepitation. Neurovascular examination is intact. ASSESSMENT/PLAN:    Patient presents to the office with a nondisplaced comminuted distal clavicle fracture. She has no pain and there really is not a reason to consider immobilization. Whether this fully heals or does not over 98% of the people do quite well with a distal clavicle fracture of this nature. I would not recommend any sling immobilization as it is doubtful this would be helpful for the patient and in fact could be counter productive. She is to return to the office as needed. XR Results (most recent):  Results from Appointment encounter on 11/29/21    XR CLAVICLE LT    Narrative  X-ray evaluation of the left clavicle reveals evidence of a comminuted but minimally displaced distal clavicle fracture           Mary Santiago MD

## 2022-03-19 PROBLEM — S72.009A HIP FRACTURE (HCC): Status: ACTIVE | Noted: 2021-09-26

## 2023-04-05 NOTE — PERIOP NOTES
TRANSFER - OUT REPORT:    Verbal report given to [address] CORRINA Delgado(name) on Blaine Kolb  being transferred to [facility](unit) for routine progression of care       Report consisted of patients Situation, Background, Assessment and   Recommendations(SBAR). Time Pre op antibiotic given:1749  Anesthesia Stop time: 9345  Zuniga Present on Transfer to floor:y  Order for Zuniga on Chart:y  Discharge Prescriptions with Chart:n    Information from the following report(s) SBAR, OR Summary, Intake/Output, MAR and Cardiac Rhythm a-fib was reviewed with the receiving nurse. Opportunity for questions and clarification was provided. Is the patient on 02? YES       L/Min 2       Other     Is the patient on a monitor? NO    Is the nurse transporting with the patient? NO    Surgical Waiting Area notified of patient's transfer from PACU? YES      The following personal items collected during your admission accompanied patient upon transfer:   Dental Appliance: Dental Appliances:  At home  Vision: Visual Aid: None  Hearing Aid: Hearing Aid: None  Jewelry:    Clothing:    Other Valuables:    Valuables sent to safe:      NO BELONGINGS IN PACU -3

## 2023-05-12 RX ORDER — MIRTAZAPINE 15 MG/1
1 TABLET, FILM COATED ORAL NIGHTLY
COMMUNITY
Start: 2021-09-29

## 2023-05-12 RX ORDER — B-COMPLEX WITH VITAMIN C
1 TABLET ORAL
COMMUNITY
Start: 2021-09-29

## 2023-05-12 RX ORDER — BUSPIRONE HYDROCHLORIDE 15 MG/1
TABLET ORAL 2 TIMES DAILY
COMMUNITY
Start: 2021-09-29

## 2023-05-12 RX ORDER — POLYETHYLENE GLYCOL 3350 17 G/17G
1 POWDER, FOR SOLUTION ORAL DAILY
COMMUNITY
Start: 2021-09-29

## 2023-05-12 RX ORDER — AMOXICILLIN 250 MG
1 CAPSULE ORAL 2 TIMES DAILY
COMMUNITY
Start: 2021-09-29

## 2023-05-12 RX ORDER — QUETIAPINE FUMARATE 50 MG/1
TABLET, FILM COATED ORAL 2 TIMES DAILY
COMMUNITY
Start: 2021-09-29

## 2023-05-12 RX ORDER — ACETAMINOPHEN 325 MG/1
TABLET ORAL EVERY 6 HOURS
COMMUNITY
Start: 2021-09-29

## 2023-05-12 RX ORDER — LEVOTHYROXINE SODIUM 0.1 MG/1
1 TABLET ORAL EVERY MORNING
COMMUNITY
Start: 2021-09-30

## 2023-05-12 RX ORDER — POTASSIUM CHLORIDE 20 MEQ/1
1 TABLET, EXTENDED RELEASE ORAL DAILY
COMMUNITY
Start: 2021-09-29

## (undated) DEVICE — SPONGE GZ W4XL4IN COT 12 PLY TYP VII WVN C FLD DSGN

## (undated) DEVICE — 6619 2 PTNT ISO SYS INCISE AREA&LT;(&GT;&&LT;)&GT;P: Brand: STERI-DRAPE™ IOBAN™ 2

## (undated) DEVICE — GOWN,SIRUS,NONRNF,SETINSLV,XL,20/CS: Brand: MEDLINE

## (undated) DEVICE — Device

## (undated) DEVICE — K-WIRE

## (undated) DEVICE — BASIN ST MAJOR-NO CAUTERY: Brand: MEDLINE INDUSTRIES, INC.

## (undated) DEVICE — COVER LT HNDL BLU PLAS

## (undated) DEVICE — SUTURE VCRL 2-0 L27IN ABSRB CT BRAID COAT UD J275H

## (undated) DEVICE — GOWN,PREVENTION PLUS,XLN/2XL,ST,22/CS: Brand: MEDLINE

## (undated) DEVICE — GLOVE SURG SZ 9 L12IN FNGR THK126MIL CRM LTX FREE

## (undated) DEVICE — ROCKER SWITCH PENCIL BLADE ELECTRODE, HOLSTER: Brand: EDGE

## (undated) DEVICE — C-ARM: Brand: UNBRANDED

## (undated) DEVICE — SOLUTION IRRIG 1000ML 0.9% SOD CHL USP POUR PLAS BTL

## (undated) DEVICE — BANDAGE COBAN 6 IN WND 6INX5YD FOAM

## (undated) DEVICE — GLOVE ORANGE PI 8 1/2   MSG9085

## (undated) DEVICE — STAPLER EXT SKIN 35 WIDE S STL STPL SQUEEZE HNDL VISISTAT

## (undated) DEVICE — REM POLYHESIVE ADULT PATIENT RETURN ELECTRODE: Brand: VALLEYLAB

## (undated) DEVICE — GUIDE WIRE, BALL-TIPPED, STERILE

## (undated) DEVICE — PREP SKN CHLRAPRP APL 26ML STR --

## (undated) DEVICE — GLOVE SURG SZ 8 L12IN FNGR THK79MIL GRN LTX FREE

## (undated) DEVICE — DRAPE,REIN 53X77,STERILE: Brand: MEDLINE

## (undated) DEVICE — PAD,ABDOMINAL,5"X9",ST,LF,25/BX: Brand: MEDLINE INDUSTRIES, INC.

## (undated) DEVICE — DRILL, AO SMALL: Brand: GAMMA